# Patient Record
Sex: MALE | Race: OTHER | HISPANIC OR LATINO | URBAN - METROPOLITAN AREA
[De-identification: names, ages, dates, MRNs, and addresses within clinical notes are randomized per-mention and may not be internally consistent; named-entity substitution may affect disease eponyms.]

---

## 2017-03-13 PROBLEM — Z00.00 ENCOUNTER FOR PREVENTIVE HEALTH EXAMINATION: Status: ACTIVE | Noted: 2017-03-13

## 2020-07-04 ENCOUNTER — INPATIENT (INPATIENT)
Facility: HOSPITAL | Age: 40
LOS: 4 days | Discharge: HOME | DRG: 387 | End: 2020-07-09
Admitting: HOSPITALIST
Payer: MEDICAID

## 2020-07-04 VITALS
HEIGHT: 67 IN | WEIGHT: 250 LBS | RESPIRATION RATE: 18 BRPM | OXYGEN SATURATION: 97 % | DIASTOLIC BLOOD PRESSURE: 82 MMHG | SYSTOLIC BLOOD PRESSURE: 129 MMHG | HEART RATE: 110 BPM | TEMPERATURE: 101 F

## 2020-07-04 PROCEDURE — 99285 EMERGENCY DEPT VISIT HI MDM: CPT

## 2020-07-04 PROCEDURE — 71045 X-RAY EXAM CHEST 1 VIEW: CPT | Mod: 26

## 2020-07-04 RX ORDER — SODIUM CHLORIDE 9 MG/ML
2000 INJECTION INTRAMUSCULAR; INTRAVENOUS; SUBCUTANEOUS ONCE
Refills: 0 | Status: COMPLETED | OUTPATIENT
Start: 2020-07-04 | End: 2020-07-04

## 2020-07-04 RX ORDER — PIPERACILLIN AND TAZOBACTAM 4; .5 G/20ML; G/20ML
3.38 INJECTION, POWDER, LYOPHILIZED, FOR SOLUTION INTRAVENOUS ONCE
Refills: 0 | Status: COMPLETED | OUTPATIENT
Start: 2020-07-04 | End: 2020-07-04

## 2020-07-04 RX ORDER — ACETAMINOPHEN 500 MG
650 TABLET ORAL ONCE
Refills: 0 | Status: COMPLETED | OUTPATIENT
Start: 2020-07-04 | End: 2020-07-04

## 2020-07-04 RX ORDER — MORPHINE SULFATE 50 MG/1
4 CAPSULE, EXTENDED RELEASE ORAL ONCE
Refills: 0 | Status: DISCONTINUED | OUTPATIENT
Start: 2020-07-04 | End: 2020-07-04

## 2020-07-05 DIAGNOSIS — Z98.890 OTHER SPECIFIED POSTPROCEDURAL STATES: Chronic | ICD-10-CM

## 2020-07-05 DIAGNOSIS — K52.9 NONINFECTIVE GASTROENTERITIS AND COLITIS, UNSPECIFIED: ICD-10-CM

## 2020-07-05 DIAGNOSIS — E11.65 TYPE 2 DIABETES MELLITUS WITH HYPERGLYCEMIA: ICD-10-CM

## 2020-07-05 DIAGNOSIS — D73.5 INFARCTION OF SPLEEN: ICD-10-CM

## 2020-07-05 LAB
ACETONE SERPL-MCNC: NEGATIVE — SIGNIFICANT CHANGE UP
ALBUMIN SERPL ELPH-MCNC: 3.3 G/DL — SIGNIFICANT CHANGE UP (ref 3.3–5.2)
ALBUMIN SERPL ELPH-MCNC: 3.7 G/DL — SIGNIFICANT CHANGE UP (ref 3.3–5.2)
ALP SERPL-CCNC: 194 U/L — HIGH (ref 40–120)
ALP SERPL-CCNC: 226 U/L — HIGH (ref 40–120)
ALT FLD-CCNC: 110 U/L — HIGH
ALT FLD-CCNC: 86 U/L — HIGH
ANION GAP SERPL CALC-SCNC: 14 MMOL/L — SIGNIFICANT CHANGE UP (ref 5–17)
APPEARANCE UR: CLEAR — SIGNIFICANT CHANGE UP
APTT BLD: 35.6 SEC — HIGH (ref 27.5–35.5)
AST SERPL-CCNC: 41 U/L — HIGH
AST SERPL-CCNC: 59 U/L — HIGH
BASOPHILS # BLD AUTO: 0.04 K/UL — SIGNIFICANT CHANGE UP (ref 0–0.2)
BASOPHILS NFR BLD AUTO: 0.5 % — SIGNIFICANT CHANGE UP (ref 0–2)
BILIRUB DIRECT SERPL-MCNC: 0.2 MG/DL — SIGNIFICANT CHANGE UP (ref 0–0.3)
BILIRUB INDIRECT FLD-MCNC: 0.5 MG/DL — SIGNIFICANT CHANGE UP (ref 0.2–1)
BILIRUB SERPL-MCNC: 0.7 MG/DL — SIGNIFICANT CHANGE UP (ref 0.4–2)
BILIRUB SERPL-MCNC: 0.8 MG/DL — SIGNIFICANT CHANGE UP (ref 0.4–2)
BILIRUB UR-MCNC: NEGATIVE — SIGNIFICANT CHANGE UP
BLD GP AB SCN SERPL QL: SIGNIFICANT CHANGE UP
BUN SERPL-MCNC: 7 MG/DL — LOW (ref 8–20)
CALCIUM SERPL-MCNC: 8.9 MG/DL — SIGNIFICANT CHANGE UP (ref 8.6–10.2)
CHLORIDE SERPL-SCNC: 92 MMOL/L — LOW (ref 98–107)
CO2 SERPL-SCNC: 26 MMOL/L — SIGNIFICANT CHANGE UP (ref 22–29)
COLOR SPEC: YELLOW — SIGNIFICANT CHANGE UP
CREAT SERPL-MCNC: 0.76 MG/DL — SIGNIFICANT CHANGE UP (ref 0.5–1.3)
CRP SERPL-MCNC: 10.8 MG/DL — HIGH (ref 0–0.4)
DIFF PNL FLD: NEGATIVE — SIGNIFICANT CHANGE UP
EOSINOPHIL # BLD AUTO: 0.08 K/UL — SIGNIFICANT CHANGE UP (ref 0–0.5)
EOSINOPHIL NFR BLD AUTO: 1 % — SIGNIFICANT CHANGE UP (ref 0–6)
ERYTHROCYTE [SEDIMENTATION RATE] IN BLOOD: 38 MM/HR — HIGH (ref 0–20)
FERRITIN SERPL-MCNC: 931 NG/ML — HIGH (ref 30–400)
GAS PNL BLDV: SIGNIFICANT CHANGE UP
GLUCOSE BLDC GLUCOMTR-MCNC: 201 MG/DL — HIGH (ref 70–99)
GLUCOSE BLDC GLUCOMTR-MCNC: 205 MG/DL — HIGH (ref 70–99)
GLUCOSE BLDC GLUCOMTR-MCNC: 251 MG/DL — HIGH (ref 70–99)
GLUCOSE BLDC GLUCOMTR-MCNC: 285 MG/DL — HIGH (ref 70–99)
GLUCOSE SERPL-MCNC: 338 MG/DL — HIGH (ref 70–99)
GLUCOSE UR QL: 250
HCO3 BLDV-SCNC: 26 MMOL/L — SIGNIFICANT CHANGE UP (ref 21–29)
HCT VFR BLD CALC: 44.3 % — SIGNIFICANT CHANGE UP (ref 39–50)
HGB BLD-MCNC: 15 G/DL — SIGNIFICANT CHANGE UP (ref 13–17)
IMM GRANULOCYTES NFR BLD AUTO: 0.8 % — SIGNIFICANT CHANGE UP (ref 0–1.5)
INR BLD: 1.17 RATIO — HIGH (ref 0.88–1.16)
KETONES UR-MCNC: NEGATIVE — SIGNIFICANT CHANGE UP
LACTATE BLDV-MCNC: 1.1 MMOL/L — SIGNIFICANT CHANGE UP (ref 0.5–2)
LDH SERPL L TO P-CCNC: 317 U/L — HIGH (ref 98–192)
LEUKOCYTE ESTERASE UR-ACNC: ABNORMAL
LIDOCAIN IGE QN: 35 U/L — SIGNIFICANT CHANGE UP (ref 22–51)
LYMPHOCYTES # BLD AUTO: 1.91 K/UL — SIGNIFICANT CHANGE UP (ref 1–3.3)
LYMPHOCYTES # BLD AUTO: 23 % — SIGNIFICANT CHANGE UP (ref 13–44)
MCHC RBC-ENTMCNC: 30.1 PG — SIGNIFICANT CHANGE UP (ref 27–34)
MCHC RBC-ENTMCNC: 33.9 GM/DL — SIGNIFICANT CHANGE UP (ref 32–36)
MCV RBC AUTO: 89 FL — SIGNIFICANT CHANGE UP (ref 80–100)
MONOCYTES # BLD AUTO: 0.37 K/UL — SIGNIFICANT CHANGE UP (ref 0–0.9)
MONOCYTES NFR BLD AUTO: 4.5 % — SIGNIFICANT CHANGE UP (ref 2–14)
NEUTROPHILS # BLD AUTO: 5.84 K/UL — SIGNIFICANT CHANGE UP (ref 1.8–7.4)
NEUTROPHILS NFR BLD AUTO: 70.2 % — SIGNIFICANT CHANGE UP (ref 43–77)
NITRITE UR-MCNC: POSITIVE
PCO2 BLDV: 46 MMHG — SIGNIFICANT CHANGE UP (ref 35–50)
PH BLDV: 7.4 — SIGNIFICANT CHANGE UP (ref 7.32–7.43)
PH UR: 7 — SIGNIFICANT CHANGE UP (ref 5–8)
PLATELET # BLD AUTO: 162 K/UL — SIGNIFICANT CHANGE UP (ref 150–400)
PO2 BLDV: 48 MMHG — HIGH (ref 25–45)
POTASSIUM SERPL-MCNC: 4 MMOL/L — SIGNIFICANT CHANGE UP (ref 3.5–5.3)
POTASSIUM SERPL-SCNC: 4 MMOL/L — SIGNIFICANT CHANGE UP (ref 3.5–5.3)
PROCALCITONIN SERPL-MCNC: 0.77 NG/ML — HIGH (ref 0.02–0.1)
PROT SERPL-MCNC: 7 G/DL — SIGNIFICANT CHANGE UP (ref 6.6–8.7)
PROT SERPL-MCNC: 7.6 G/DL — SIGNIFICANT CHANGE UP (ref 6.6–8.7)
PROT UR-MCNC: 30 MG/DL
PROTHROM AB SERPL-ACNC: 13.5 SEC — SIGNIFICANT CHANGE UP (ref 10.6–13.6)
RBC # BLD: 4.98 M/UL — SIGNIFICANT CHANGE UP (ref 4.2–5.8)
RBC # FLD: 12.5 % — SIGNIFICANT CHANGE UP (ref 10.3–14.5)
RBC CASTS # UR COMP ASSIST: SIGNIFICANT CHANGE UP /HPF (ref 0–4)
SAO2 % BLDV: 82 % — SIGNIFICANT CHANGE UP
SARS-COV-2 IGG SERPL QL IA: NEGATIVE — SIGNIFICANT CHANGE UP
SARS-COV-2 IGM SERPL IA-ACNC: 0.11 INDEX — SIGNIFICANT CHANGE UP
SARS-COV-2 RNA SPEC QL NAA+PROBE: SIGNIFICANT CHANGE UP
SODIUM SERPL-SCNC: 132 MMOL/L — LOW (ref 135–145)
SP GR SPEC: 1 — LOW (ref 1.01–1.02)
UROBILINOGEN FLD QL: NEGATIVE — SIGNIFICANT CHANGE UP
WBC # BLD: 8.31 K/UL — SIGNIFICANT CHANGE UP (ref 3.8–10.5)
WBC # FLD AUTO: 8.31 K/UL — SIGNIFICANT CHANGE UP (ref 3.8–10.5)
WBC UR QL: SIGNIFICANT CHANGE UP

## 2020-07-05 PROCEDURE — 99223 1ST HOSP IP/OBS HIGH 75: CPT | Mod: GC

## 2020-07-05 PROCEDURE — 93010 ELECTROCARDIOGRAM REPORT: CPT

## 2020-07-05 PROCEDURE — 74177 CT ABD & PELVIS W/CONTRAST: CPT | Mod: 26

## 2020-07-05 PROCEDURE — 12345: CPT | Mod: NC

## 2020-07-05 RX ORDER — ACETAMINOPHEN 500 MG
1000 TABLET ORAL ONCE
Refills: 0 | Status: COMPLETED | OUTPATIENT
Start: 2020-07-05 | End: 2020-07-05

## 2020-07-05 RX ORDER — DEXTROSE 50 % IN WATER 50 %
15 SYRINGE (ML) INTRAVENOUS ONCE
Refills: 0 | Status: DISCONTINUED | OUTPATIENT
Start: 2020-07-05 | End: 2020-07-09

## 2020-07-05 RX ORDER — ACETAMINOPHEN 500 MG
325 TABLET ORAL ONCE
Refills: 0 | Status: COMPLETED | OUTPATIENT
Start: 2020-07-05 | End: 2020-07-05

## 2020-07-05 RX ORDER — GLUCAGON INJECTION, SOLUTION 0.5 MG/.1ML
1 INJECTION, SOLUTION SUBCUTANEOUS ONCE
Refills: 0 | Status: DISCONTINUED | OUTPATIENT
Start: 2020-07-05 | End: 2020-07-09

## 2020-07-05 RX ORDER — DEXTROSE 50 % IN WATER 50 %
12.5 SYRINGE (ML) INTRAVENOUS ONCE
Refills: 0 | Status: DISCONTINUED | OUTPATIENT
Start: 2020-07-05 | End: 2020-07-09

## 2020-07-05 RX ORDER — ONDANSETRON 8 MG/1
4 TABLET, FILM COATED ORAL EVERY 6 HOURS
Refills: 0 | Status: DISCONTINUED | OUTPATIENT
Start: 2020-07-05 | End: 2020-07-09

## 2020-07-05 RX ORDER — SODIUM CHLORIDE 9 MG/ML
1000 INJECTION INTRAMUSCULAR; INTRAVENOUS; SUBCUTANEOUS
Refills: 0 | Status: DISCONTINUED | OUTPATIENT
Start: 2020-07-05 | End: 2020-07-09

## 2020-07-05 RX ORDER — DEXTROSE 50 % IN WATER 50 %
25 SYRINGE (ML) INTRAVENOUS ONCE
Refills: 0 | Status: DISCONTINUED | OUTPATIENT
Start: 2020-07-05 | End: 2020-07-09

## 2020-07-05 RX ORDER — INSULIN LISPRO 100/ML
VIAL (ML) SUBCUTANEOUS AT BEDTIME
Refills: 0 | Status: DISCONTINUED | OUTPATIENT
Start: 2020-07-05 | End: 2020-07-09

## 2020-07-05 RX ORDER — INSULIN LISPRO 100/ML
VIAL (ML) SUBCUTANEOUS
Refills: 0 | Status: DISCONTINUED | OUTPATIENT
Start: 2020-07-05 | End: 2020-07-09

## 2020-07-05 RX ORDER — SODIUM CHLORIDE 9 MG/ML
1000 INJECTION, SOLUTION INTRAVENOUS
Refills: 0 | Status: DISCONTINUED | OUTPATIENT
Start: 2020-07-05 | End: 2020-07-09

## 2020-07-05 RX ORDER — INSULIN LISPRO 100/ML
3 VIAL (ML) SUBCUTANEOUS
Refills: 0 | Status: DISCONTINUED | OUTPATIENT
Start: 2020-07-05 | End: 2020-07-06

## 2020-07-05 RX ORDER — ENOXAPARIN SODIUM 100 MG/ML
40 INJECTION SUBCUTANEOUS EVERY 12 HOURS
Refills: 0 | Status: DISCONTINUED | OUTPATIENT
Start: 2020-07-05 | End: 2020-07-09

## 2020-07-05 RX ORDER — ACETAMINOPHEN 500 MG
650 TABLET ORAL EVERY 4 HOURS
Refills: 0 | Status: DISCONTINUED | OUTPATIENT
Start: 2020-07-05 | End: 2020-07-09

## 2020-07-05 RX ORDER — CEFTRIAXONE 500 MG/1
1000 INJECTION, POWDER, FOR SOLUTION INTRAMUSCULAR; INTRAVENOUS ONCE
Refills: 0 | Status: COMPLETED | OUTPATIENT
Start: 2020-07-05 | End: 2020-07-05

## 2020-07-05 RX ORDER — CEFTRIAXONE 500 MG/1
INJECTION, POWDER, FOR SOLUTION INTRAMUSCULAR; INTRAVENOUS
Refills: 0 | Status: DISCONTINUED | OUTPATIENT
Start: 2020-07-05 | End: 2020-07-09

## 2020-07-05 RX ORDER — CEFTRIAXONE 500 MG/1
1000 INJECTION, POWDER, FOR SOLUTION INTRAMUSCULAR; INTRAVENOUS EVERY 24 HOURS
Refills: 0 | Status: DISCONTINUED | OUTPATIENT
Start: 2020-07-06 | End: 2020-07-09

## 2020-07-05 RX ORDER — INSULIN GLARGINE 100 [IU]/ML
15 INJECTION, SOLUTION SUBCUTANEOUS AT BEDTIME
Refills: 0 | Status: DISCONTINUED | OUTPATIENT
Start: 2020-07-05 | End: 2020-07-06

## 2020-07-05 RX ADMIN — Medication 1 APPLICATION(S): at 17:23

## 2020-07-05 RX ADMIN — Medication 650 MILLIGRAM(S): at 21:35

## 2020-07-05 RX ADMIN — Medication 1 APPLICATION(S): at 05:16

## 2020-07-05 RX ADMIN — ENOXAPARIN SODIUM 40 MILLIGRAM(S): 100 INJECTION SUBCUTANEOUS at 05:16

## 2020-07-05 RX ADMIN — Medication 325 MILLIGRAM(S): at 22:26

## 2020-07-05 RX ADMIN — MORPHINE SULFATE 4 MILLIGRAM(S): 50 CAPSULE, EXTENDED RELEASE ORAL at 00:29

## 2020-07-05 RX ADMIN — Medication 3 UNIT(S): at 11:26

## 2020-07-05 RX ADMIN — ENOXAPARIN SODIUM 40 MILLIGRAM(S): 100 INJECTION SUBCUTANEOUS at 17:24

## 2020-07-05 RX ADMIN — Medication 400 MILLIGRAM(S): at 06:35

## 2020-07-05 RX ADMIN — SODIUM CHLORIDE 100 MILLILITER(S): 9 INJECTION INTRAMUSCULAR; INTRAVENOUS; SUBCUTANEOUS at 06:35

## 2020-07-05 RX ADMIN — PIPERACILLIN AND TAZOBACTAM 200 GRAM(S): 4; .5 INJECTION, POWDER, LYOPHILIZED, FOR SOLUTION INTRAVENOUS at 00:29

## 2020-07-05 RX ADMIN — Medication 650 MILLIGRAM(S): at 00:29

## 2020-07-05 RX ADMIN — Medication 2: at 11:26

## 2020-07-05 RX ADMIN — Medication 3 UNIT(S): at 17:25

## 2020-07-05 RX ADMIN — Medication 3 UNIT(S): at 08:14

## 2020-07-05 RX ADMIN — Medication 3: at 17:25

## 2020-07-05 RX ADMIN — CEFTRIAXONE 100 MILLIGRAM(S): 500 INJECTION, POWDER, FOR SOLUTION INTRAMUSCULAR; INTRAVENOUS at 05:14

## 2020-07-05 RX ADMIN — Medication 650 MILLIGRAM(S): at 16:05

## 2020-07-05 RX ADMIN — SODIUM CHLORIDE 2000 MILLILITER(S): 9 INJECTION INTRAMUSCULAR; INTRAVENOUS; SUBCUTANEOUS at 00:29

## 2020-07-05 RX ADMIN — Medication 3: at 08:14

## 2020-07-05 RX ADMIN — INSULIN GLARGINE 15 UNIT(S): 100 INJECTION, SOLUTION SUBCUTANEOUS at 21:33

## 2020-07-05 NOTE — H&P ADULT - ASSESSMENT
40y M with PMHx significant for Uncontrolled DM II, presents today c/o abdominal pain. Being admitted for Splenic infarct of unclear etiology.    > Admit to medicine  > Bed: Any    > Diet: Diabetic   > Activity: ambulate as tolerated  > Nursing: vitals per routine    Abdominal pain 2/2 Splenic infarct of unclear etiology.  Differentials include hypercoagulable states: including COVID 19  Denies any clot disorders running on his family.   CT abdomen: Multiple vague wedge-shaped opacities in enlarged spleen, likely represent splenic infarcts.   S/P Zosyn in the ED  Will empirically cover with Rocephin, will adjust Abx as needed  Blood cx pending   COVID PCR pending    Uncontrolled DM II  Pt poor complaint, not on any anti-diabetic meds at this time   A1c pending   Will start Lantus 15 U at bed time  Hypoglycemic protocol in place  Low ISS    Balanitis 2/2 poor glucose controlled  Not on any meds for DM  Will start Clotrimazole topical     Hyponatremia   Likely due to poor PO intake  Will give NS 100cc/h for 12 hours  Will trend     Transaminitis   Likely TEJEDA  Lifestyle changes advised     DVT Prophylaxis   Lovenox SC    Code status  Full code 40y M with PMHx significant for Uncontrolled DM II, presents today c/o abdominal pain. Being admitted for Splenic infarct of unclear etiology, complicated by SIRS    > Admit to medicine  > Bed: Any    > Diet: Diabetic   > Activity: ambulate as tolerated  > Nursing: vitals per routine    Abdominal pain 2/2 Splenic infarct of unclear etiology, complicated by SIRS  Differentials include hypercoagulable states: including COVID 19  Denies any clot disorders running on his family.   CT abdomen: Multiple vague wedge-shaped opacities in enlarged spleen, likely represent splenic infarcts.   S/P Zosyn in the ED  Will empirically cover with Rocephin, will adjust Abx as needed  Will give NS 100cc/h for 12 hours  Blood cx pending   COVID PCR pending    Uncontrolled DM II  Pt poor complaint, not on any anti-diabetic meds at this time   A1c pending   Will start Lantus 15 U at bed time  Hypoglycemic protocol in place  Low ISS    Balanitis 2/2 poor glucose controlled  Not on any meds for DM  Will start Clotrimazole topical     Pseudohyponatremia   Corrected Na: 136  Will trend     Transaminitis   Likely TEJEDA  Lifestyle changes advised     DVT Prophylaxis   Lovenox SC    Code status  Full code 40y M with PMHx significant for Uncontrolled DM II, presents today c/o abdominal pain. Being admitted for Splenic infarct of unclear etiology, complicated by SIRS. Balanitis     > Admit to medicine  > Bed: Any    > Diet: Diabetic   > Activity: ambulate as tolerated  > Nursing: vitals per routine    Abdominal pain 2/2 Splenic infarct of unclear etiology, complicated by SIRS  Differentials include hyper-coagulable states/septic emboli?/Malignancy: including COVID 19  Denies any clot disorders running in family  CT abdomen: Multiple vague wedge-shaped opacities in enlarged spleen, likely represent splenic infarcts.   Consider Hematology eval if non-infectious etiology  MARIO if blood cx pos.  S/P Zosyn in the ED  Add probiotics while on abx  Will empirically cover with Rocephin, will adjust Abx as needed  Will give NS 100cc/h for 12 hours  Blood cx pending   trend temp curve, WBC  MAP/SBP above goal, cap refill normal, doubt balanitis cause of SIRS  COVID PCR pending    Uncontrolled DM II  Pt poor complaint, not on any anti-diabetic meds at this time   A1c pending   Will start Lantus 15 U at bed time  Hypoglycemic protocol in place  Low ISS  DM teaching     Balanitis 2/2 poor glucose controlled  Not on any meds for DM  Will start Clotrimazole topical     Pseudohyponatremia   Corrected Na: 136  Will trend     Transaminitis   Likely TEJEDA  Lifestyle changes advised   check Hep profile    DVT Prophylaxis   Lovenox SC/ambulation/OOB    Code status  Full code

## 2020-07-05 NOTE — ED PROVIDER NOTE - CLINICAL SUMMARY MEDICAL DECISION MAKING FREE TEXT BOX
40y M presenting for 1 day of fever and LLQ pain.  Pt tachycardic and febrile on arrival.  Will initiate sepsis workup.  Labs, EKG, CXR, CT abd/pelvis, UA, cultures.  Will treat with Zosyn, tylenol, morphine, IV fluid bolus (based on IBW), clotrimazole for balanitis.

## 2020-07-05 NOTE — PROGRESS NOTE ADULT - SUBJECTIVE AND OBJECTIVE BOX
INTERVAL HPI/OVERNIGHT EVENTS:    CC:    Vital Signs Last 24 Hrs  T(C): 37.3 (2020 08:02), Max: 38.1 (2020 23:04)  T(F): 99.2 (2020 08:02), Max: 100.6 (2020 23:04)  HR: 91 (2020 08:02) (91 - 110)  BP: 104/66 (2020 08:02) (104/66 - 133/80)  BP(mean): --  RR: 20 (2020 08:02) (18 - 20)  SpO2: 94% (2020 08:02) (94% - 97%)    PHYSICAL EXAM:    GENERAL:   CHEST/LUNG:   HEART:   ABDOMEN:   EXTREMITIES:      MEDICATIONS  (STANDING):  cefTRIAXone   IVPB      clotrimazole 1% Cream 1 Application(s) Topical two times a day  dextrose 5%. 1000 milliLiter(s) (50 mL/Hr) IV Continuous <Continuous>  dextrose 50% Injectable 12.5 Gram(s) IV Push once  dextrose 50% Injectable 25 Gram(s) IV Push once  dextrose 50% Injectable 25 Gram(s) IV Push once  enoxaparin Injectable 40 milliGRAM(s) SubCutaneous every 12 hours  insulin glargine Injectable (LANTUS) 15 Unit(s) SubCutaneous at bedtime  insulin lispro (HumaLOG) corrective regimen sliding scale   SubCutaneous three times a day before meals  insulin lispro (HumaLOG) corrective regimen sliding scale   SubCutaneous at bedtime  insulin lispro Injectable (HumaLOG) 3 Unit(s) SubCutaneous three times a day before meals  sodium chloride 0.9%. 1000 milliLiter(s) (100 mL/Hr) IV Continuous <Continuous>    MEDICATIONS  (PRN):  acetaminophen   Tablet .. 650 milliGRAM(s) Oral every 4 hours PRN Temp greater or equal to 38C (100.4F), Mild Pain (1 - 3)  dextrose 40% Gel 15 Gram(s) Oral once PRN Blood Glucose LESS THAN 70 milliGRAM(s)/deciliter  glucagon  Injectable 1 milliGRAM(s) IntraMuscular once PRN Glucose LESS THAN 70 milligrams/deciliter  ondansetron Injectable 4 milliGRAM(s) IV Push every 6 hours PRN Nausea      Allergies    No Known Allergies    Intolerances          LABS:                          15.0   8.31  )-----------( 162      ( 2020 00:27 )             44.3     07    132<L>  |  92<L>  |  7.0<L>  ----------------------------<  338<H>  4.0   |  26.0  |  0.76    Ca    8.9      2020 00:27    TPro  7.0  /  Alb  3.3  /  TBili  0.7  /  DBili  0.2  /  AST  41<H>  /  ALT  86<H>  /  AlkPhos  194<H>      PT/INR - ( 2020 00:27 )   PT: 13.5 sec;   INR: 1.17 ratio         PTT - ( 2020 00:27 )  PTT:35.6 sec  Urinalysis Basic - ( 2020 03:53 )    Color: Yellow / Appearance: Clear / S.005 / pH: x  Gluc: x / Ketone: Negative  / Bili: Negative / Urobili: Negative   Blood: x / Protein: 30 mg/dL / Nitrite: Positive   Leuk Esterase: Trace / RBC: 0-2 /HPF / WBC 3-5   Sq Epi: x / Non Sq Epi: x / Bacteria: x        RADIOLOGY & ADDITIONAL TESTS: INTERVAL HPI/OVERNIGHT EVENTS:    CC: SIRS r/o sepsis, uncontrolled diabetes mellitus, splenic infarct    Chart and course reviewed. Mild left upper quadrant pain. No nausea, vomiting, no sick contacts. Unsure about what medications he takes at home for diabetes.     Vital Signs Last 24 Hrs  T(C): 37.3 (2020 08:02), Max: 38.1 (2020 23:04)  T(F): 99.2 (2020 08:02), Max: 100.6 (2020 23:04)  HR: 91 (2020 08:02) (91 - 110)  BP: 104/66 (2020 08:02) (104/66 - 133/80)  BP(mean): --  RR: 20 (2020 08:02) (18 - 20)  SpO2: 94% (2020 08:02) (94% - 97%)    PHYSICAL EXAM:    GENERAL: alert, not in distress, well built  CHEST/LUNG: b/l air entry  HEART: regular  ABDOMEN: left upper quadrant tenderness  EXTREMITIES:  no edema, tenderness    MEDICATIONS  (STANDING):  cefTRIAXone   IVPB      clotrimazole 1% Cream 1 Application(s) Topical two times a day  dextrose 5%. 1000 milliLiter(s) (50 mL/Hr) IV Continuous <Continuous>  dextrose 50% Injectable 12.5 Gram(s) IV Push once  dextrose 50% Injectable 25 Gram(s) IV Push once  dextrose 50% Injectable 25 Gram(s) IV Push once  enoxaparin Injectable 40 milliGRAM(s) SubCutaneous every 12 hours  insulin glargine Injectable (LANTUS) 15 Unit(s) SubCutaneous at bedtime  insulin lispro (HumaLOG) corrective regimen sliding scale   SubCutaneous three times a day before meals  insulin lispro (HumaLOG) corrective regimen sliding scale   SubCutaneous at bedtime  insulin lispro Injectable (HumaLOG) 3 Unit(s) SubCutaneous three times a day before meals  sodium chloride 0.9%. 1000 milliLiter(s) (100 mL/Hr) IV Continuous <Continuous>    MEDICATIONS  (PRN):  acetaminophen   Tablet .. 650 milliGRAM(s) Oral every 4 hours PRN Temp greater or equal to 38C (100.4F), Mild Pain (1 - 3)  dextrose 40% Gel 15 Gram(s) Oral once PRN Blood Glucose LESS THAN 70 milliGRAM(s)/deciliter  glucagon  Injectable 1 milliGRAM(s) IntraMuscular once PRN Glucose LESS THAN 70 milligrams/deciliter  ondansetron Injectable 4 milliGRAM(s) IV Push every 6 hours PRN Nausea      Allergies    No Known Allergies    Intolerances          LABS:                          15.0   8.31  )-----------( 162      ( 2020 00:27 )             44.3     07-    132<L>  |  92<L>  |  7.0<L>  ----------------------------<  338<H>  4.0   |  26.0  |  0.76    Ca    8.9      2020 00:27    TPro  7.0  /  Alb  3.3  /  TBili  0.7  /  DBili  0.2  /  AST  41<H>  /  ALT  86<H>  /  AlkPhos  194<H>  07-    PT/INR - ( 2020 00:27 )   PT: 13.5 sec;   INR: 1.17 ratio         PTT - ( 2020 00:27 )  PTT:35.6 sec  Urinalysis Basic - ( 2020 03:53 )    Color: Yellow / Appearance: Clear / S.005 / pH: x  Gluc: x / Ketone: Negative  / Bili: Negative / Urobili: Negative   Blood: x / Protein: 30 mg/dL / Nitrite: Positive   Leuk Esterase: Trace / RBC: 0-2 /HPF / WBC 3-5   Sq Epi: x / Non Sq Epi: x / Bacteria: x        RADIOLOGY & ADDITIONAL TESTS:

## 2020-07-05 NOTE — ED ADULT NURSE REASSESSMENT NOTE - NS ED NURSE REASSESS COMMENT FT1
Received report from offgoing RN. Charting as noted. Patient A&Ox3 in no apparent distress. Patient complaining of abdominal pain, but has improved since arrived to ED. Vital signs stable. See flowsheet as per vital signs. Patient remains on cardiac monitor with continuous pulse ox. Respirations even, spontaneous, and unlabored. Awaiting CT results and UA sample. Plan of care explained. Verbalized understanding. Call bell within reach.

## 2020-07-05 NOTE — ED PROVIDER NOTE - OBJECTIVE STATEMENT
40y M w/ no known PMH, presenting for fever and abdominal pain x 1 day.  Pt complains of intermittent LLQ pain, worse with cough, as well as fever and body aches.  Took Advil x 2 this morning.  Denies cough, CP, SOB, n/v/d, urinary complaints, flank pain, melena, hematochezia.  Also notes rash to penis.  Denies sick contacts.  No hx of abdominal surgery.  Last bowel movement was earlier today.  Has never had a colonoscopy.

## 2020-07-05 NOTE — ED ADULT NURSE NOTE - OBJECTIVE STATEMENT
40y Male A&Ox4 c/o abdominal pain, headache, and subjective fevers. Pt reports sudden onset of symtoms yesterday, states the abd pain is consistent and reducing his PO intake. Pt denies LOC, blurry vision, chest pain, shortness of breath, changes in GI/ patterns. No obvious signs of trauma or injury.

## 2020-07-05 NOTE — ED PROVIDER NOTE - ATTENDING CONTRIBUTION TO CARE
HPI: 39yo male with no PMH presenting with fevers x 1day, a/w LLQ pain. no nausea/vomiting, no urinary symptoms. No fevers/chills. no CP/SOB.     PE:  Gen: NAD  Head: NCAT  HEENT: Oral mucosa moist, normal conjunctiva  CV: RRR no murmurs, normal perfusion  Resp: CTA b/l, no wheezing, rales, rhonchi, no respiratory distress  GI: Abd Soft +TTP LLQ, +rebound tenderness  Neuro: No focal neuro deficits  MSK: FROM all 4 extremities, no deformity  Skin: No rash, no bruising  Psych: Normal affect    MDM: Pt with fever and lower abdominal pain- check labs, ct a/p, give fluids, antibiotics and reassess. Mariana Longoria DO         I performed a history and physical exam of the patient and discussed their management with the resident. I reviewed the resident's note and agree with the documented findings and plan of care. My medical decision making and observations are found above.

## 2020-07-05 NOTE — ED PROVIDER NOTE - CCCP TRG CHIEF CMPLNT
Establish care with a primary care provider for re-evaluation.    Bentyl as needed for abdominal cramping, Protonix daily, Zofran for nausea.  Drink lots of fluids, stay well hydrated.  BRAT diet, progress as tolerated.    Follow-up with primary for re-evaluation.  Abstain from sexual intercourse until you are retested.  Please return to this ED if symptoms persist or worsen, if unable to tolerate food or liquids, if you begin with fever or shortness of breath, if any other problems occur.   multiple medical complaints

## 2020-07-05 NOTE — H&P ADULT - NSHPLABSRESULTS_GEN_ALL_CORE
15.0   8.31  )-----------( 162      ( 05 Jul 2020 00:27 )             44.3       07-05    132<L>  |  92<L>  |  7.0<L>  ----------------------------<  338<H>  4.0   |  26.0  |  0.76    Ca    8.9      05 Jul 2020 00:27    TPro  7.6  /  Alb  3.7  /  TBili  0.8  /  DBili  x   /  AST  59<H>  /  ALT  110<H>  /  AlkPhos  226<H>  07-05      < from: CT Abdomen and Pelvis w/ IV Cont (07.05.20 @ 02:05) >      IMPRESSION:    Multiple vague wedge-shaped opacities in enlarged spleen. Findings are nonspecific, likely represent splenic infarcts. Infectious/inflammatory or neoplastic process considered in the differential. Consider nonemergent correlation with MR for better characterization.    Wall thickening of distal sigmoid colonic loop extending to the rectum significant surrounding stranding. This may be on the basis of inadequate distention or represent proctocolitis in appropriate clinical setting. Consider nonemergent colonoscopy evaluation if there concern for underlying lesion. No evidence of small bowel obstruction.     Normal appendix.      < end of copied text >

## 2020-07-05 NOTE — CHART NOTE - NSCHARTNOTEFT_GEN_A_CORE
Notified by RN pt rectal temp 103.8 F, P 105. Pt is a 39 y/o male admitted for abdominal pain found to have splenic infarct and proctocolitis. Pt on Rocephin IVPB. Concern for sepsis-BC and UC drawn today and pending results. Pt is stable, in no signs of acute distress, vital signs stable /88, RR 22 O2 sat 92% on room, air. RN administered Tylenol 650mg PO for temp prior to notification, ordered an additional Tylenol 325mg tab PO x1 dose and instructed RN to apply cool compresses to B/L axilla and groin, repeat rectal temp in one hour and notify PA. RN to continue to monitor pt and escalate PRN. Notified by RN pt rectal temp 103.8 F, P 105. Pt is a 39 y/o male admitted for abdominal pain found to have splenic infarct and proctocolitis. Pt on Rocephin IVPB. Concern for sepsis-BC and UC drawn today and pending results. Pt is stable, in no signs of acute distress, vital signs stable /88, RR 22 O2 sat 92% on room, air. RN administered Tylenol 650mg PO for temp prior to notification, ordered an additional Tylenol 325mg tab PO x1 dose and instructed RN to apply cool compresses to B/L axilla and groin, repeat rectal temp in one hour and notify PA. RN to continue to monitor pt and escalate PRN. Repeat temp at 1 hour post Tylenol administration 101.4F rectal. Continue cool compresses and continue to monitor.     7/06 04:00AM: Notified by RN pt temp this  F rectal. Pt hemodynamically stable. STAT lactate 0.9. Ordered Ofirmev 1 gram IVPB x1 dose and cooling blanket to be applied. Repeat rectal temp in 1 hour. Continue to monitor.

## 2020-07-05 NOTE — H&P ADULT - NSHPSOCIALHISTORY_GEN_ALL_CORE
Drinks socially, denies smoking or illicit drugs Drinks socially CAGE 0/4, denies smoking or illicit drugs

## 2020-07-05 NOTE — H&P ADULT - NSHPSOURCEINFORD_GEN_ALL_CORE
[facility]  [address] [id number] [phone]            Hermelindo Duffy  [address] [place] [address]        August 7, 2019    Dear Hermelindo,    While refilling your prescription today, we noticed that you are due for an appointment with your provider.  We will refill your prescription for 30 days, but a follow-up appointment must be made before any additional refills can be approved.     Taking care of your health is important to us and we look forward to seeing you in the near future.  Please call us at [phone] or [phone] (or use Membersuite) to schedule an appointment.     Please disregard this notice if you have already made an appointment.    Sincerely,        [facility]  
Patient/Physician/Provider/Chart(s)

## 2020-07-05 NOTE — ED PROVIDER NOTE - PHYSICAL EXAMINATION
Constitutional: Awake, alert, in no acute distress  Eyes: no scleral icterus  HENT: normocephalic, atraumatic, moist oral mucosa  CV: +tachycardia, regular rhythm, no murmur  Pulm: non-labored respirations, CTAB  Abdomen: soft, non-distended, +LLQ tenderness with +rebound, no guarding, no CVAT  : uncircumcised male, +mild erythema with scant whitish exudate noted to glans penis; no testicular tenderness.  Exam chaperoned by Juan VASQUEZ.  Extremities: no edema, no deformity  Skin: no other rash, no jaundice  Neuro: AAOx3, moving all extremities equally

## 2020-07-05 NOTE — H&P ADULT - HISTORY OF PRESENT ILLNESS
40y M with PMHx significant for Uncontrolled DM II, presents today c/o abdominal pain. States, pain started 2 days ago, but has progressively worsened, is located in left side abdominal wall, non radiated, cramping like pain. Additionally endorses subjective fever, malaise, nausea w/o vomiting, took Advil but not improvement was noted.  Also, reports redness and itchiness at the tip of his penis. He has been diagnosed with DM II many years ago but is not on any treatment or follows up with any doctor   Denies any sick contact, recent travel, clots disorders or malignancy

## 2020-07-05 NOTE — ED PROVIDER NOTE - NS ED ROS FT
Constitutional: +fever, +body aches  Eyes: no vision changes  ENT: no nasal congestion, no sore throat  CV: no chest pain  Resp: no cough, no shortness of breath  GI: +abdominal pain, no vomiting, no diarrhea  : no dysuria  MSK: no joint pain  Skin: +rash  Neuro: no headache, no weakness, no paresthesias

## 2020-07-05 NOTE — H&P ADULT - NSHPPHYSICALEXAM_GEN_ALL_CORE
Vital Signs Last 24 Hrs  T(C): 38.1 (04 Jul 2020 23:04), Max: 38.1 (04 Jul 2020 23:04)  T(F): 100.6 (04 Jul 2020 23:04), Max: 100.6 (04 Jul 2020 23:04)  HR: 94 (05 Jul 2020 03:05) (94 - 110)  BP: 124/72 (05 Jul 2020 03:05) (124/72 - 133/80)  RR: 20 (05 Jul 2020 03:05) (18 - 20)  SpO2: 95% (05 Jul 2020 03:05) (95% - 97%)    General: Obese man, NAD  Head:  Normocephalic, atraumatic  ENT:  Mucosa moist, no ulcerations  Neck:  Supple, no sinuses or palpable masses  Respiratory: CTA B/L  CV: RRR, S1S2, no murmur  Abdominal: Soft, Tender to palpation in the LLQ and LUQ areas, no palpable mass, + BS   Extremities: No edema, + peripheral pulses, FROM all 4 extremity  Neurology: A&Ox3, CN II-XII grossly normal Vital Signs Last 24 Hrs  T(C): 38.1 (04 Jul 2020 23:04), Max: 38.1 (04 Jul 2020 23:04)  T(F): 100.6 (04 Jul 2020 23:04), Max: 100.6 (04 Jul 2020 23:04)  HR: 94 (05 Jul 2020 03:05) (94 - 110)  BP: 124/72 (05 Jul 2020 03:05) (124/72 - 133/80)  RR: 20 (05 Jul 2020 03:05) (18 - 20)  SpO2: 95% (05 Jul 2020 03:05) (95% - 97%)    General: Obese man, NAD  Head:  Normocephalic, atraumatic  ENT:  Mucosa moist, no ulcerations  Neck:  Supple, no sinuses or palpable masses  Respiratory: CTA B/L  CV: RRR, S1S2, no murmur  Abdominal: Soft, Tender to palpation in the LLQ and LUQ areas, no palpable mass, + BS   : Circinate balanitis   Extremities: No edema, + peripheral pulses, FROM all 4 extremity  Neurology: A&Ox3, CN II-XII grossly normal

## 2020-07-06 LAB
A1C WITH ESTIMATED AVERAGE GLUCOSE RESULT: 13.3 % — HIGH (ref 4–5.6)
ALBUMIN SERPL ELPH-MCNC: 3.5 G/DL — SIGNIFICANT CHANGE UP (ref 3.3–5.2)
ALP SERPL-CCNC: 224 U/L — HIGH (ref 40–120)
ALT FLD-CCNC: 98 U/L — HIGH
ANION GAP SERPL CALC-SCNC: 14 MMOL/L — SIGNIFICANT CHANGE UP (ref 5–17)
AST SERPL-CCNC: 58 U/L — HIGH
BASOPHILS # BLD AUTO: 0.03 K/UL — SIGNIFICANT CHANGE UP (ref 0–0.2)
BASOPHILS NFR BLD AUTO: 0.3 % — SIGNIFICANT CHANGE UP (ref 0–2)
BILIRUB DIRECT SERPL-MCNC: 0.2 MG/DL — SIGNIFICANT CHANGE UP (ref 0–0.3)
BILIRUB INDIRECT FLD-MCNC: 0.6 MG/DL — SIGNIFICANT CHANGE UP (ref 0.2–1)
BILIRUB SERPL-MCNC: 0.8 MG/DL — SIGNIFICANT CHANGE UP (ref 0.4–2)
BUN SERPL-MCNC: 6 MG/DL — LOW (ref 8–20)
CALCIUM SERPL-MCNC: 8.7 MG/DL — SIGNIFICANT CHANGE UP (ref 8.6–10.2)
CHLORIDE SERPL-SCNC: 97 MMOL/L — LOW (ref 98–107)
CO2 SERPL-SCNC: 23 MMOL/L — SIGNIFICANT CHANGE UP (ref 22–29)
CREAT SERPL-MCNC: 0.71 MG/DL — SIGNIFICANT CHANGE UP (ref 0.5–1.3)
CULTURE RESULTS: NO GROWTH — SIGNIFICANT CHANGE UP
D DIMER BLD IA.RAPID-MCNC: 6490 NG/ML DDU — HIGH
EOSINOPHIL # BLD AUTO: 0.02 K/UL — SIGNIFICANT CHANGE UP (ref 0–0.5)
EOSINOPHIL NFR BLD AUTO: 0.2 % — SIGNIFICANT CHANGE UP (ref 0–6)
ESTIMATED AVERAGE GLUCOSE: 335 MG/DL — HIGH (ref 68–114)
FERRITIN SERPL-MCNC: 1133 NG/ML — HIGH (ref 30–400)
GLUCOSE BLDC GLUCOMTR-MCNC: 216 MG/DL — HIGH (ref 70–99)
GLUCOSE BLDC GLUCOMTR-MCNC: 238 MG/DL — HIGH (ref 70–99)
GLUCOSE BLDC GLUCOMTR-MCNC: 246 MG/DL — HIGH (ref 70–99)
GLUCOSE BLDC GLUCOMTR-MCNC: 302 MG/DL — HIGH (ref 70–99)
GLUCOSE SERPL-MCNC: 246 MG/DL — HIGH (ref 70–99)
HCT VFR BLD CALC: 40.7 % — SIGNIFICANT CHANGE UP (ref 39–50)
HGB BLD-MCNC: 13.7 G/DL — SIGNIFICANT CHANGE UP (ref 13–17)
IMM GRANULOCYTES NFR BLD AUTO: 0.9 % — SIGNIFICANT CHANGE UP (ref 0–1.5)
LACTATE BLDV-MCNC: 0.9 MMOL/L — SIGNIFICANT CHANGE UP (ref 0.5–2)
LDH SERPL L TO P-CCNC: 412 U/L — HIGH (ref 98–192)
LYMPHOCYTES # BLD AUTO: 2.54 K/UL — SIGNIFICANT CHANGE UP (ref 1–3.3)
LYMPHOCYTES # BLD AUTO: 26.4 % — SIGNIFICANT CHANGE UP (ref 13–44)
MCHC RBC-ENTMCNC: 29.9 PG — SIGNIFICANT CHANGE UP (ref 27–34)
MCHC RBC-ENTMCNC: 33.7 GM/DL — SIGNIFICANT CHANGE UP (ref 32–36)
MCV RBC AUTO: 88.9 FL — SIGNIFICANT CHANGE UP (ref 80–100)
MONOCYTES # BLD AUTO: 0.42 K/UL — SIGNIFICANT CHANGE UP (ref 0–0.9)
MONOCYTES NFR BLD AUTO: 4.4 % — SIGNIFICANT CHANGE UP (ref 2–14)
NEUTROPHILS # BLD AUTO: 6.53 K/UL — SIGNIFICANT CHANGE UP (ref 1.8–7.4)
NEUTROPHILS NFR BLD AUTO: 67.8 % — SIGNIFICANT CHANGE UP (ref 43–77)
PLATELET # BLD AUTO: 228 K/UL — SIGNIFICANT CHANGE UP (ref 150–400)
POTASSIUM SERPL-MCNC: 3.8 MMOL/L — SIGNIFICANT CHANGE UP (ref 3.5–5.3)
POTASSIUM SERPL-SCNC: 3.8 MMOL/L — SIGNIFICANT CHANGE UP (ref 3.5–5.3)
PROT SERPL-MCNC: 7.3 G/DL — SIGNIFICANT CHANGE UP (ref 6.6–8.7)
RBC # BLD: 4.58 M/UL — SIGNIFICANT CHANGE UP (ref 4.2–5.8)
RBC # FLD: 12.7 % — SIGNIFICANT CHANGE UP (ref 10.3–14.5)
SODIUM SERPL-SCNC: 134 MMOL/L — LOW (ref 135–145)
SPECIMEN SOURCE: SIGNIFICANT CHANGE UP
WBC # BLD: 9.63 K/UL — SIGNIFICANT CHANGE UP (ref 3.8–10.5)
WBC # FLD AUTO: 9.63 K/UL — SIGNIFICANT CHANGE UP (ref 3.8–10.5)

## 2020-07-06 PROCEDURE — 99223 1ST HOSP IP/OBS HIGH 75: CPT

## 2020-07-06 PROCEDURE — 99233 SBSQ HOSP IP/OBS HIGH 50: CPT

## 2020-07-06 PROCEDURE — 93970 EXTREMITY STUDY: CPT | Mod: 26

## 2020-07-06 RX ORDER — INSULIN GLARGINE 100 [IU]/ML
20 INJECTION, SOLUTION SUBCUTANEOUS AT BEDTIME
Refills: 0 | Status: DISCONTINUED | OUTPATIENT
Start: 2020-07-06 | End: 2020-07-09

## 2020-07-06 RX ORDER — ACETAMINOPHEN 500 MG
1000 TABLET ORAL ONCE
Refills: 0 | Status: COMPLETED | OUTPATIENT
Start: 2020-07-06 | End: 2020-07-06

## 2020-07-06 RX ORDER — INSULIN LISPRO 100/ML
6 VIAL (ML) SUBCUTANEOUS
Refills: 0 | Status: DISCONTINUED | OUTPATIENT
Start: 2020-07-06 | End: 2020-07-09

## 2020-07-06 RX ADMIN — Medication 6 UNIT(S): at 16:53

## 2020-07-06 RX ADMIN — Medication 4: at 12:04

## 2020-07-06 RX ADMIN — ENOXAPARIN SODIUM 40 MILLIGRAM(S): 100 INJECTION SUBCUTANEOUS at 04:44

## 2020-07-06 RX ADMIN — CEFTRIAXONE 100 MILLIGRAM(S): 500 INJECTION, POWDER, FOR SOLUTION INTRAMUSCULAR; INTRAVENOUS at 06:04

## 2020-07-06 RX ADMIN — ENOXAPARIN SODIUM 40 MILLIGRAM(S): 100 INJECTION SUBCUTANEOUS at 17:11

## 2020-07-06 RX ADMIN — Medication 650 MILLIGRAM(S): at 07:51

## 2020-07-06 RX ADMIN — Medication 650 MILLIGRAM(S): at 16:53

## 2020-07-06 RX ADMIN — Medication 3 UNIT(S): at 07:50

## 2020-07-06 RX ADMIN — INSULIN GLARGINE 20 UNIT(S): 100 INJECTION, SOLUTION SUBCUTANEOUS at 21:16

## 2020-07-06 RX ADMIN — Medication 2: at 07:50

## 2020-07-06 RX ADMIN — Medication 3 UNIT(S): at 12:04

## 2020-07-06 RX ADMIN — Medication 2: at 16:53

## 2020-07-06 RX ADMIN — Medication 400 MILLIGRAM(S): at 04:43

## 2020-07-06 RX ADMIN — Medication 1 APPLICATION(S): at 04:44

## 2020-07-06 RX ADMIN — Medication 1 APPLICATION(S): at 17:11

## 2020-07-06 NOTE — CONSULT NOTE ADULT - SUBJECTIVE AND OBJECTIVE BOX
[facility]  INFECTIOUS DISEASES AND INTERNAL MEDICINE at [place]  =======================================================  Jose Luis Pedro MD  Diplomates American Board of Internal Medicine and Infectious Diseases  Tel: [phone]      Fax: [phone]  =======================================================      N-[id number]  ROHINI SINGH    CC: Patient is a 40y old  Male who presents with a chief complaint of Abdominal pain (05 Jul 2020 08:59)      40y  Male with h/o Uncontrolled DM II. Patient presented to the ER 7/5 with c/o abdominal pain associated with fevers which started 7/3 night. Symptoms progressively worsened. The Abdominal pain is located in left side abdominal wall, non radiating, cramping like pain. Patient took Advil with no improvement in symptoms. He has been diagnosed with DM II many years ago but is not on any treatment or follows up with any doctor. Denies any sick contact, recent travel, clots disorders or malignancy. Iatient remains febrile, to 104F on 7/6. No leukocytosis on admission. Started on Ceftriaxone. ID input requested        Past Medical & Surgical Hx:  No Past Medical History  H/O left knee surgery  No Past Surgical History      Social Hx:  Former smoker      FAMILY HISTORY:  No family history of DM in parents or siblings.        Allergies  No Known Allergies       REVIEW OF SYSTEMS:  CONSTITUTIONAL:  + Fever + chills  HEENT:  No diplopia or blurred vision.  No earache, sore throat or runny nose.  CARDIOVASCULAR:  No pressure, squeezing, strangling, tightness, heaviness or aching about the chest, neck, axilla or epigastrium.  RESPIRATORY:  No cough, shortness of breath  GASTROINTESTINAL:  No nausea, vomiting or diarrhea. + Left sided abdominal pain  GENITOURINARY:  No dysuria, frequency or urgency.   MUSCULOSKELETAL:  no joint aches, no muscle pain  SKIN:  No change in skin, hair or nails.  NEUROLOGIC:  No Headaches, seizures   PSYCHIATRIC:  No disorder of thought or mood.  ENDOCRINE:  No heat or cold intolerance  HEMATOLOGICAL:  No easy bruising or bleeding.       Physical Exam:  GEN: NAD, pleasant  HEENT: normocephalic and atraumatic. EOMI. PERRL.  Anicteric  NECK: Supple.   LUNGS: Clear to auscultation.  HEART: Regular rate and rhythm   ABDOMEN: Soft, and nondistended. + Tenderness left of umbilicus, no rebound or guarding. Positive bowel sounds.    : No CVA tenderness  EXTREMITIES: Without any edema.  MSK: No joint swelling  NEUROLOGIC: No Focal Deficits  PSYCHIATRIC: Appropriate affect .  SKIN: No Rash      Vitals:  T(F): 100.4 (06 Jul 2020 07:54), Max: 104 (06 Jul 2020 03:57)  HR: 89 (06 Jul 2020 07:23)  BP: 109/66 (06 Jul 2020 07:23)  RR: 19 (06 Jul 2020 07:23)  SpO2: 96% (06 Jul 2020 07:23) (92% - 96%)  temp max in last 48H T(F): , Max: 104 (07-06-20 @ 03:57)      Current Antibiotics:  cefTRIAXone   IVPB      cefTRIAXone   IVPB 1000 milliGRAM(s) IV Intermittent every 24 hours      Other medications:  clotrimazole 1% Cream 1 Application(s) Topical two times a day  dextrose 5%. 1000 milliLiter(s) IV Continuous <Continuous>  dextrose 50% Injectable 12.5 Gram(s) IV Push once  dextrose 50% Injectable 25 Gram(s) IV Push once  dextrose 50% Injectable 25 Gram(s) IV Push once  enoxaparin Injectable 40 milliGRAM(s) SubCutaneous every 12 hours  insulin glargine Injectable (LANTUS) 15 Unit(s) SubCutaneous at bedtime  insulin lispro (HumaLOG) corrective regimen sliding scale   SubCutaneous three times a day before meals  insulin lispro (HumaLOG) corrective regimen sliding scale   SubCutaneous at bedtime  insulin lispro Injectable (HumaLOG) 3 Unit(s) SubCutaneous three times a day before meals  sodium chloride 0.9%. 1000 milliLiter(s) IV Continuous <Continuous>      Labs:                        13.7   9.63  )-----------( 228      ( 06 Jul 2020 04:49 )             40.7      07-06    134<L>  |  97<L>  |  6.0<L>  ----------------------------<  246<H>  3.8   |  23.0  |  0.71    Ca    8.7      06 Jul 2020 04:49    TPro  7.3  /  Alb  3.5  /  TBili  0.8  /  DBili  0.2  /  AST  58<H>  /  ALT  98<H>  /  AlkPhos  224<H>  07-06      Culture - Urine (collected 07-05-20 @ 08:46)  Source: .Urine Clean Catch (Midstream)  Final Report (07-06-20 @ 07:19):    No growth      WBC Count: 9.63 K/uL (07-06-20 @ 04:49)  WBC Count: 8.31 K/uL (07-05-20 @ 00:27)    Creatinine, Serum: 0.71 mg/dL (07-06-20 @ 04:49)  Creatinine, Serum: 0.76 mg/dL (07-05-20 @ 00:27)    C-Reactive Protein, Serum: 10.80 mg/dL (07-05-20 @ 06:46)    Ferritin, Serum: 1133 ng/mL (07-06-20 @ 04:49)  Ferritin, Serum: 931 ng/mL (07-05-20 @ 06:46)    Sedimentation Rate, Erythrocyte: 38 mm/hr (07-05-20 @ 06:46)    Procalcitonin, Serum: 0.77 ng/mL (07-05-20 @ 06:46)    COVID-19 PCR: NotDetec (07-05-20 @ 05:31)          < from: CT Abdomen and Pelvis w/ IV Cont (07.05.20 @ 02:05) >  EXAM:  CT ABDOMEN AND PELVIS IC                          PROCEDURE DATE:  07/05/2020      INTERPRETATION:  CT ABDOMEN AND PELVIS WITH CONTRAST    INDICATION: Left lower quadrant abdominal pain.    TECHNIQUE: Contrast enhanced CT of the abdomenand pelvis. Images are reformatted in the sagittal and coronal planes.    90 mL of Omnipaque 350 contrast material was injected IV.    COMPARISON: None.    FINDINGS:    Lower Thorax: No consolidation or effusion.    Liver: Enlarged fatty liver measuring 23 cm in length. Suggestion of fatty sparing at the gallbladder fossa.  Biliary: No significant dilatation. No calcified gallstones within the gallbladder.  Spleen: Enlarged measuring 16 cm in length. Multiple vague wedge-shaped opacities. Findings are nonspecific, likely represent splenic infarcts. Infectious/inflammatory or neoplastic process considered in the differential. Consider nonemergent correlation with MR for better characterization.  Pancreas: No inflammatory changes or ductal dilatation.  Adrenals: Normal.  Kidneys: Symmetrically enhancing without hydronephrosis.  Vessels: Normal caliber. Mild atherosclerotic disease of pelvic vessels.    GI tract: There is wall thickening of distal sigmoid colonic loop extending to the rectumsignificant surrounding stranding. This may be on the basis of inadequate distention or represent proctocolitis in appropriate clinical setting. Consider nonemergent colonoscopy evaluation this concern for underlying lesion. Mild diverticulosis without evidence of acute diverticulitis. No evidence of small bowel obstruction. Normal appendix.    Peritoneum/retroperitoneum and mesentery: No free air. No organized fluid collection. No adenopathy.    Pelvic organs/Bladder: No pelvic masses. Bladder is normal.    Abdominal wall: A small fat containing umbilical hernia is noted.  Bones and soft tissues: Mild multilevel degenerative changes of the spine are noted.    IMPRESSION:    Multiple vague wedge-shaped opacities in enlarged spleen. Findings are nonspecific, likely represent splenic infarcts. Infectious/inflammatory or neoplastic process considered in the differential. Consider nonemergent correlation with MR for better characterization.    Wall thickening of distal sigmoid colonic loop extending to the rectum significant surrounding stranding. This may be on the basis of inadequate distention or represent proctocolitis in appropriate clinical setting. Consider nonemergent colonoscopy evaluation if there concern for underlying lesion. No evidence of small bowel obstruction.     Normal appendix.    < end of copied text >        < from: Xray Chest 1 View-PORTABLE IMMEDIATE (07.04.20 @ 23:57) >  EXAM:  XR CHEST PORTABLE IMMED 1V                          PROCEDURE DATE:  07/04/2020      INTERPRETATION:  Clinical Information: Sepsis    Technique: AP chest image.     Comparison: None    Findings/  Impression: The heart is unremarkable. The lungs are clear. Bones are unremarkable for age.    < end of copied text >

## 2020-07-06 NOTE — CONSULT NOTE ADULT - ASSESSMENT
40y  Male with h/o Uncontrolled DM II. Patient presented to the ER 7/5 with c/o abdominal pain associated with fevers which started 7/3 night. Symptoms progressively worsened. The Abdominal pain is located in left side abdominal wall, non radiating, cramping like pain. Patient took Advil with no improvement in symptoms. He has been diagnosed with DM II many years ago but is not on any treatment or follows up with any doctor. Denies any sick contact, recent travel, clots disorders or malignancy. Iatient remains febrile, to 104F on 7/6. No leukocytosis on admission. Started on Ceftriaxone.       Fever  Abdominal pain  Splenic infarcts   Uncontrolled DM II        - Blood cultures pending  - Repeat blood cultures if febrile   - COVID 19 PCR pending  - CT A/P with splenic infarcts  - CXR reporting no pneumonia   - UA with no pyuria   - Procalcitonin level 0.77  - Will check TTE  - Check US venus duplex  - Continue Ceftriaxone   - Trend Fever  - Trend Leukocytosis      Will Follow

## 2020-07-06 NOTE — PROGRESS NOTE ADULT - SUBJECTIVE AND OBJECTIVE BOX
INTERVAL HPI/OVERNIGHT EVENTS:    CC: SIRS r/o sepsis, uncontrolled diabetes mellitus, splenic infarct      Febrile overnight, normal appetite, abdominal pain better. No diarrhea.    Vital Signs Last 24 Hrs  T(C): 38 (2020 07:54), Max: 40 (2020 03:57)  T(F): 100.4 (2020 07:54), Max: 104 (2020 03:57)  HR: 89 (2020 07:23) (89 - 112)  BP: 109/66 (2020 07:23) (109/66 - 133/88)  BP(mean): --  RR: 19 (2020 07:23) (18 - 22)  SpO2: 96% (2020 07:23) (92% - 96%)    PHYSICAL EXAM:    GENERAL: alert, not in distress, skin warm  CHEST/LUNG: b/l air entry  HEART: regular  ABDOMEN: soft, bs+  EXTREMITIES:  no edema, tenderness    MEDICATIONS  (STANDING):  cefTRIAXone   IVPB      cefTRIAXone   IVPB 1000 milliGRAM(s) IV Intermittent every 24 hours  clotrimazole 1% Cream 1 Application(s) Topical two times a day  dextrose 5%. 1000 milliLiter(s) (50 mL/Hr) IV Continuous <Continuous>  dextrose 50% Injectable 12.5 Gram(s) IV Push once  dextrose 50% Injectable 25 Gram(s) IV Push once  dextrose 50% Injectable 25 Gram(s) IV Push once  enoxaparin Injectable 40 milliGRAM(s) SubCutaneous every 12 hours  insulin glargine Injectable (LANTUS) 15 Unit(s) SubCutaneous at bedtime  insulin lispro (HumaLOG) corrective regimen sliding scale   SubCutaneous three times a day before meals  insulin lispro (HumaLOG) corrective regimen sliding scale   SubCutaneous at bedtime  insulin lispro Injectable (HumaLOG) 3 Unit(s) SubCutaneous three times a day before meals  sodium chloride 0.9%. 1000 milliLiter(s) (100 mL/Hr) IV Continuous <Continuous>    MEDICATIONS  (PRN):  acetaminophen   Tablet .. 650 milliGRAM(s) Oral every 4 hours PRN Temp greater or equal to 38C (100.4F), Mild Pain (1 - 3)  dextrose 40% Gel 15 Gram(s) Oral once PRN Blood Glucose LESS THAN 70 milliGRAM(s)/deciliter  glucagon  Injectable 1 milliGRAM(s) IntraMuscular once PRN Glucose LESS THAN 70 milligrams/deciliter  ondansetron Injectable 4 milliGRAM(s) IV Push every 6 hours PRN Nausea      Allergies    No Known Allergies    Intolerances          LABS:                          13.7   9.63  )-----------( 228      ( 2020 04:49 )             40.7     07-06    134<L>  |  97<L>  |  6.0<L>  ----------------------------<  246<H>  3.8   |  23.0  |  0.71    Ca    8.7      2020 04:49    TPro  7.3  /  Alb  3.5  /  TBili  0.8  /  DBili  0.2  /  AST  58<H>  /  ALT  98<H>  /  AlkPhos  224<H>  07-06    PT/INR - ( 2020 00:27 )   PT: 13.5 sec;   INR: 1.17 ratio         PTT - ( 2020 00:27 )  PTT:35.6 sec  Urinalysis Basic - ( 2020 03:53 )    Color: Yellow / Appearance: Clear / S.005 / pH: x  Gluc: x / Ketone: Negative  / Bili: Negative / Urobili: Negative   Blood: x / Protein: 30 mg/dL / Nitrite: Positive   Leuk Esterase: Trace / RBC: 0-2 /HPF / WBC 3-5   Sq Epi: x / Non Sq Epi: x / Bacteria: x        RADIOLOGY & ADDITIONAL TESTS:

## 2020-07-07 LAB
ALBUMIN SERPL ELPH-MCNC: 3.4 G/DL — SIGNIFICANT CHANGE UP (ref 3.3–5.2)
ALP SERPL-CCNC: 224 U/L — HIGH (ref 40–120)
ALT FLD-CCNC: 95 U/L — HIGH
ANION GAP SERPL CALC-SCNC: 13 MMOL/L — SIGNIFICANT CHANGE UP (ref 5–17)
AST SERPL-CCNC: 50 U/L — HIGH
BILIRUB DIRECT SERPL-MCNC: 0.1 MG/DL — SIGNIFICANT CHANGE UP (ref 0–0.3)
BILIRUB INDIRECT FLD-MCNC: 0.5 MG/DL — SIGNIFICANT CHANGE UP (ref 0.2–1)
BILIRUB SERPL-MCNC: 0.6 MG/DL — SIGNIFICANT CHANGE UP (ref 0.4–2)
BUN SERPL-MCNC: 8 MG/DL — SIGNIFICANT CHANGE UP (ref 8–20)
CALCIUM SERPL-MCNC: 8.9 MG/DL — SIGNIFICANT CHANGE UP (ref 8.6–10.2)
CHLORIDE SERPL-SCNC: 95 MMOL/L — LOW (ref 98–107)
CO2 SERPL-SCNC: 27 MMOL/L — SIGNIFICANT CHANGE UP (ref 22–29)
CREAT SERPL-MCNC: 0.75 MG/DL — SIGNIFICANT CHANGE UP (ref 0.5–1.3)
FERRITIN SERPL-MCNC: 1487 NG/ML — HIGH (ref 30–400)
GLUCOSE BLDC GLUCOMTR-MCNC: 191 MG/DL — HIGH (ref 70–99)
GLUCOSE BLDC GLUCOMTR-MCNC: 202 MG/DL — HIGH (ref 70–99)
GLUCOSE BLDC GLUCOMTR-MCNC: 217 MG/DL — HIGH (ref 70–99)
GLUCOSE BLDC GLUCOMTR-MCNC: 237 MG/DL — HIGH (ref 70–99)
GLUCOSE SERPL-MCNC: 203 MG/DL — HIGH (ref 70–99)
HCT VFR BLD CALC: 42.2 % — SIGNIFICANT CHANGE UP (ref 39–50)
HGB BLD-MCNC: 13.7 G/DL — SIGNIFICANT CHANGE UP (ref 13–17)
LDH SERPL L TO P-CCNC: 384 U/L — HIGH (ref 98–192)
MAGNESIUM SERPL-MCNC: 2.2 MG/DL — SIGNIFICANT CHANGE UP (ref 1.6–2.6)
MCHC RBC-ENTMCNC: 29.6 PG — SIGNIFICANT CHANGE UP (ref 27–34)
MCHC RBC-ENTMCNC: 32.5 GM/DL — SIGNIFICANT CHANGE UP (ref 32–36)
MCV RBC AUTO: 91.1 FL — SIGNIFICANT CHANGE UP (ref 80–100)
PHOSPHATE SERPL-MCNC: 3.2 MG/DL — SIGNIFICANT CHANGE UP (ref 2.4–4.7)
PLATELET # BLD AUTO: 303 K/UL — SIGNIFICANT CHANGE UP (ref 150–400)
POTASSIUM SERPL-MCNC: 4.1 MMOL/L — SIGNIFICANT CHANGE UP (ref 3.5–5.3)
POTASSIUM SERPL-SCNC: 4.1 MMOL/L — SIGNIFICANT CHANGE UP (ref 3.5–5.3)
PROCALCITONIN SERPL-MCNC: 0.75 NG/ML — HIGH (ref 0.02–0.1)
PROT SERPL-MCNC: 7.5 G/DL — SIGNIFICANT CHANGE UP (ref 6.6–8.7)
RBC # BLD: 4.63 M/UL — SIGNIFICANT CHANGE UP (ref 4.2–5.8)
RBC # FLD: 13 % — SIGNIFICANT CHANGE UP (ref 10.3–14.5)
SARS-COV-2 RNA SPEC QL NAA+PROBE: SIGNIFICANT CHANGE UP
SODIUM SERPL-SCNC: 135 MMOL/L — SIGNIFICANT CHANGE UP (ref 135–145)
WBC # BLD: 8.17 K/UL — SIGNIFICANT CHANGE UP (ref 3.8–10.5)
WBC # FLD AUTO: 8.17 K/UL — SIGNIFICANT CHANGE UP (ref 3.8–10.5)

## 2020-07-07 PROCEDURE — 99233 SBSQ HOSP IP/OBS HIGH 50: CPT

## 2020-07-07 RX ADMIN — Medication 6 UNIT(S): at 11:53

## 2020-07-07 RX ADMIN — Medication 2: at 11:54

## 2020-07-07 RX ADMIN — Medication 6 UNIT(S): at 17:04

## 2020-07-07 RX ADMIN — Medication 1 APPLICATION(S): at 05:10

## 2020-07-07 RX ADMIN — ENOXAPARIN SODIUM 40 MILLIGRAM(S): 100 INJECTION SUBCUTANEOUS at 17:05

## 2020-07-07 RX ADMIN — Medication 650 MILLIGRAM(S): at 05:09

## 2020-07-07 RX ADMIN — Medication 1 APPLICATION(S): at 17:05

## 2020-07-07 RX ADMIN — Medication 6 UNIT(S): at 07:38

## 2020-07-07 RX ADMIN — ENOXAPARIN SODIUM 40 MILLIGRAM(S): 100 INJECTION SUBCUTANEOUS at 05:09

## 2020-07-07 RX ADMIN — Medication 2: at 07:38

## 2020-07-07 RX ADMIN — Medication 1: at 17:04

## 2020-07-07 RX ADMIN — CEFTRIAXONE 100 MILLIGRAM(S): 500 INJECTION, POWDER, FOR SOLUTION INTRAMUSCULAR; INTRAVENOUS at 05:09

## 2020-07-07 RX ADMIN — INSULIN GLARGINE 20 UNIT(S): 100 INJECTION, SOLUTION SUBCUTANEOUS at 21:15

## 2020-07-07 NOTE — PROGRESS NOTE ADULT - SUBJECTIVE AND OBJECTIVE BOX
[facility]  INFECTIOUS DISEASES AND INTERNAL MEDICINE at [place]  =======================================================  Jose Luis Pedro MD  Diplomates American Board of Internal Medicine and Infectious Diseases  Tel: [phone]      Fax: [phone]  =======================================================    ROHINI SINGH [id number]    Seen with     Follow up: Fever    No fevers since yesterday     No complaints today     Allergies:  No Known Allergies       REVIEW OF SYSTEMS:  CONSTITUTIONAL:  No Fever + chills  HEENT:  No diplopia or blurred vision.  No earache, sore throat or runny nose.  CARDIOVASCULAR:  No pressure, squeezing, strangling, tightness, heaviness or aching about the chest, neck, axilla or epigastrium.  RESPIRATORY:  No cough, shortness of breath  GASTROINTESTINAL:  No nausea, vomiting or diarrhea. + Left sided abdominal pain improved   GENITOURINARY:  No dysuria, frequency or urgency.   MUSCULOSKELETAL:  no joint aches, no muscle pain  SKIN:  No change in skin, hair or nails.  NEUROLOGIC:  No Headaches, seizures   PSYCHIATRIC:  No disorder of thought or mood.  ENDOCRINE:  No heat or cold intolerance  HEMATOLOGICAL:  No easy bruising or bleeding.       Physical Exam:  GEN: NAD, pleasant  HEENT: normocephalic and atraumatic. EOMI. PERRL.  Anicteric  NECK: Supple.   LUNGS: Clear to auscultation.  HEART: Regular rate and rhythm   ABDOMEN: Soft, and nondistended. No Tenderness, no rebound or guarding. Positive bowel sounds.    : No CVA tenderness  EXTREMITIES: Without any edema.  MSK: No joint swelling  NEUROLOGIC: No Focal Deficits  PSYCHIATRIC: Appropriate affect .  SKIN: No Rash        Vitals:  T(F): 99.1 (07 Jul 2020 07:29), Max: 99.2 (06 Jul 2020 16:25)  HR: 86 (07 Jul 2020 07:29)  BP: 101/64 (07 Jul 2020 07:29)  RR: 18 (07 Jul 2020 07:29)  SpO2: 94% (07 Jul 2020 07:29) (94% - 95%)  temp max in last 48H T(F): , Max: 104 (07-06-20 @ 03:57)      Current Antibiotics:  cefTRIAXone   IVPB      cefTRIAXone   IVPB 1000 milliGRAM(s) IV Intermittent every 24 hours    Other medications:  clotrimazole 1% Cream 1 Application(s) Topical two times a day  dextrose 5%. 1000 milliLiter(s) IV Continuous <Continuous>  dextrose 50% Injectable 12.5 Gram(s) IV Push once  dextrose 50% Injectable 25 Gram(s) IV Push once  dextrose 50% Injectable 25 Gram(s) IV Push once  enoxaparin Injectable 40 milliGRAM(s) SubCutaneous every 12 hours  insulin glargine Injectable (LANTUS) 20 Unit(s) SubCutaneous at bedtime  insulin lispro (HumaLOG) corrective regimen sliding scale   SubCutaneous three times a day before meals  insulin lispro (HumaLOG) corrective regimen sliding scale   SubCutaneous at bedtime  insulin lispro Injectable (HumaLOG) 6 Unit(s) SubCutaneous three times a day with meals  sodium chloride 0.9%. 1000 milliLiter(s) IV Continuous <Continuous>        Labs:                        13.7   8.17  )-----------( 303      ( 07 Jul 2020 07:27 )             42.2      07-07    135  |  95<L>  |  8.0  ----------------------------<  203<H>  4.1   |  27.0  |  0.75    Ca    8.9      07 Jul 2020 07:27  Phos  3.2     07-07  Mg     2.2     07-07    TPro  7.3  /  Alb  3.5  /  TBili  0.8  /  DBili  0.2  /  AST  58<H>  /  ALT  98<H>  /  AlkPhos  224<H>  07-06      Culture - Urine (collected 07-05-20 @ 08:46)  Source: .Urine Clean Catch (Midstream)  Final Report (07-06-20 @ 07:19):    No growth    Culture - Blood (collected 07-05-20 @ 00:29)  Source: .Blood Blood-Peripheral    Culture - Blood (collected 07-05-20 @ 00:29)  Source: .Blood Blood-Peripheral    WBC Count: 8.17 K/uL (07-07-20 @ 07:27)  WBC Count: 9.63 K/uL (07-06-20 @ 04:49)  WBC Count: 8.31 K/uL (07-05-20 @ 00:27)    Creatinine, Serum: 0.75 mg/dL (07-07-20 @ 07:27)  Creatinine, Serum: 0.71 mg/dL (07-06-20 @ 04:49)  Creatinine, Serum: 0.76 mg/dL (07-05-20 @ 00:27)    C-Reactive Protein, Serum: 10.80 mg/dL (07-05-20 @ 06:46)    Ferritin, Serum: 1133 ng/mL (07-06-20 @ 04:49)  Ferritin, Serum: 931 ng/mL (07-05-20 @ 06:46)    Sedimentation Rate, Erythrocyte: 38 mm/hr (07-05-20 @ 06:46)    Procalcitonin, Serum: 0.75 ng/mL (07-07-20 @ 07:27)  Procalcitonin, Serum: 0.77 ng/mL (07-05-20 @ 06:46)    COVID-19 PCR: NotDetec (07-06-20 @ 11:15)  COVID-19 PCR: NotDetec (07-05-20 @ 05:31)        < from: US Duplex Venous Lower Ext Complete, Bilateral (07.06.20 @ 15:25) >  EXAM:  US DPLX LWR EXT VEINS COMPL BI                          PROCEDURE DATE:  07/06/2020      INTERPRETATION:  CLINICAL INFORMATION: Fever. Left leg pain.    COMPARISON: None available.    TECHNIQUE: Duplex sonography of the BILATERAL LOWER extremity veins with color and spectral Doppler, with and without compression.      FINDINGS:    There is normal compressibility of the bilateral common femoral, femoral and popliteal veins.   Doppler examination shows normal spontaneous and phasic flow.    No calf vein thrombosis is detected.    IMPRESSION:   No evidence of deep venous thrombosis in either lower extremity.    < end of copied text >      < from: CT Abdomen and Pelvis w/ IV Cont (07.05.20 @ 02:05) >   EXAM:  CT ABDOMEN AND PELVIS IC                          PROCEDURE DATE:  07/05/2020      INTERPRETATION:  CT ABDOMEN AND PELVIS WITH CONTRAST    INDICATION: Left lower quadrant abdominal pain.    TECHNIQUE: Contrast enhanced CT of the abdomenand pelvis. Images are reformatted in the sagittal and coronal planes.    90 mL of Omnipaque 350 contrast material was injected IV.    COMPARISON: None.    FINDINGS:    Lower Thorax: No consolidation or effusion.    Liver: Enlarged fatty liver measuring 23 cm in length. Suggestion of fatty sparing at the gallbladder fossa.  Biliary: No significant dilatation. No calcified gallstones within the gallbladder.  Spleen: Enlarged measuring 16 cm in length. Multiple vague wedge-shaped opacities. Findings are nonspecific, likely represent splenic infarcts. Infectious/inflammatory or neoplastic process considered in the differential. Consider nonemergent correlation with MR for better characterization.  Pancreas: No inflammatory changes or ductal dilatation.  Adrenals: Normal.  Kidneys: Symmetrically enhancing without hydronephrosis.  Vessels: Normal caliber. Mild atherosclerotic disease of pelvic vessels.    GI tract: There is wall thickening of distal sigmoid colonic loop extending to the rectumsignificant surrounding stranding. This may be on the basis of inadequate distention or represent proctocolitis in appropriate clinical setting. Consider nonemergent colonoscopy evaluation this concern for underlying lesion. Mild diverticulosis without evidence of acute diverticulitis. No evidence of small bowel obstruction. Normal appendix.    Peritoneum/retroperitoneum and mesentery: No free air. No organized fluid collection. No adenopathy.    Pelvic organs/Bladder: No pelvic masses. Bladder is normal.    Abdominal wall: A small fat containing umbilical hernia is noted.  Bones and soft tissues: Mild multilevel degenerative changes of the spine are noted.    IMPRESSION:    Multiple vague wedge-shaped opacities in enlarged spleen. Findings are nonspecific, likely represent splenic infarcts. Infectious/inflammatory or neoplastic process considered in the differential. Consider nonemergent correlation with MR for better characterization.    Wall thickening of distal sigmoid colonic loop extending to the rectum significant surrounding stranding. This may be on the basis of inadequate distention or represent proctocolitis in appropriate clinical setting. Consider nonemergent colonoscopy evaluation if there concern for underlying lesion. No evidence of small bowel obstruction.     Normal appendix.    < end of copied text >

## 2020-07-07 NOTE — PROGRESS NOTE ADULT - SUBJECTIVE AND OBJECTIVE BOX
INTERVAL HPI/OVERNIGHT EVENTS:    CC:  SIRS r/o sepsis, uncontrolled diabetes mellitus, splenic infarct      No fever, feels better, abdominal pain resolved. No diarrhea. Explained A1C results and need for insulin on discharge.    Vital Signs Last 24 Hrs  T(C): 37.3 (07 Jul 2020 07:29), Max: 37.3 (06 Jul 2020 16:25)  T(F): 99.1 (07 Jul 2020 07:29), Max: 99.2 (06 Jul 2020 16:25)  HR: 86 (07 Jul 2020 07:29) (82 - 86)  BP: 101/64 (07 Jul 2020 07:29) (101/64 - 106/67)  BP(mean): --  RR: 18 (07 Jul 2020 07:29) (18 - 18)  SpO2: 94% (07 Jul 2020 07:29) (94% - 95%)    PHYSICAL EXAM:    GENERAL: alert, not in distress  CHEST/LUNG: b/l air entry  HEART: regular  ABDOMEN: soft, non tender, BS+  EXTREMITIES:  no edema, tenderness    MEDICATIONS  (STANDING):  cefTRIAXone   IVPB      cefTRIAXone   IVPB 1000 milliGRAM(s) IV Intermittent every 24 hours  clotrimazole 1% Cream 1 Application(s) Topical two times a day  dextrose 5%. 1000 milliLiter(s) (50 mL/Hr) IV Continuous <Continuous>  dextrose 50% Injectable 12.5 Gram(s) IV Push once  dextrose 50% Injectable 25 Gram(s) IV Push once  dextrose 50% Injectable 25 Gram(s) IV Push once  enoxaparin Injectable 40 milliGRAM(s) SubCutaneous every 12 hours  insulin glargine Injectable (LANTUS) 20 Unit(s) SubCutaneous at bedtime  insulin lispro (HumaLOG) corrective regimen sliding scale   SubCutaneous three times a day before meals  insulin lispro (HumaLOG) corrective regimen sliding scale   SubCutaneous at bedtime  insulin lispro Injectable (HumaLOG) 6 Unit(s) SubCutaneous three times a day with meals  sodium chloride 0.9%. 1000 milliLiter(s) (100 mL/Hr) IV Continuous <Continuous>    MEDICATIONS  (PRN):  acetaminophen   Tablet .. 650 milliGRAM(s) Oral every 4 hours PRN Temp greater or equal to 38C (100.4F), Mild Pain (1 - 3)  dextrose 40% Gel 15 Gram(s) Oral once PRN Blood Glucose LESS THAN 70 milliGRAM(s)/deciliter  glucagon  Injectable 1 milliGRAM(s) IntraMuscular once PRN Glucose LESS THAN 70 milligrams/deciliter  ondansetron Injectable 4 milliGRAM(s) IV Push every 6 hours PRN Nausea      Allergies    No Known Allergies    Intolerances          LABS:                          13.7   8.17  )-----------( 303      ( 07 Jul 2020 07:27 )             42.2     07-07    135  |  95<L>  |  8.0  ----------------------------<  203<H>  4.1   |  27.0  |  0.75    Ca    8.9      07 Jul 2020 07:27  Phos  3.2     07-07  Mg     2.2     07-07    TPro  7.5  /  Alb  3.4  /  TBili  0.6  /  DBili  0.1  /  AST  50<H>  /  ALT  95<H>  /  AlkPhos  224<H>  07-07          RADIOLOGY & ADDITIONAL TESTS:

## 2020-07-08 ENCOUNTER — TRANSCRIPTION ENCOUNTER (OUTPATIENT)
Age: 40
End: 2020-07-08

## 2020-07-08 DIAGNOSIS — D47.3 ESSENTIAL (HEMORRHAGIC) THROMBOCYTHEMIA: ICD-10-CM

## 2020-07-08 LAB
ALBUMIN SERPL ELPH-MCNC: 3.5 G/DL — SIGNIFICANT CHANGE UP (ref 3.3–5.2)
ALP SERPL-CCNC: 239 U/L — HIGH (ref 40–120)
ALT FLD-CCNC: 166 U/L — HIGH
ANION GAP SERPL CALC-SCNC: 14 MMOL/L — SIGNIFICANT CHANGE UP (ref 5–17)
AST SERPL-CCNC: 127 U/L — HIGH
BASOPHILS # BLD AUTO: 0.05 K/UL — SIGNIFICANT CHANGE UP (ref 0–0.2)
BASOPHILS NFR BLD AUTO: 0.7 % — SIGNIFICANT CHANGE UP (ref 0–2)
BILIRUB SERPL-MCNC: 0.4 MG/DL — SIGNIFICANT CHANGE UP (ref 0.4–2)
BUN SERPL-MCNC: 12 MG/DL — SIGNIFICANT CHANGE UP (ref 8–20)
CALCIUM SERPL-MCNC: 9.2 MG/DL — SIGNIFICANT CHANGE UP (ref 8.6–10.2)
CHLORIDE SERPL-SCNC: 98 MMOL/L — SIGNIFICANT CHANGE UP (ref 98–107)
CO2 SERPL-SCNC: 26 MMOL/L — SIGNIFICANT CHANGE UP (ref 22–29)
CREAT SERPL-MCNC: 0.75 MG/DL — SIGNIFICANT CHANGE UP (ref 0.5–1.3)
EBV EA AB SER IA-ACNC: <5 U/ML — SIGNIFICANT CHANGE UP
EBV EA AB TITR SER IF: POSITIVE
EBV EA IGG SER-ACNC: NEGATIVE — SIGNIFICANT CHANGE UP
EBV NA IGG SER IA-ACNC: >600 U/ML — HIGH
EBV PATRN SPEC IB-IMP: SIGNIFICANT CHANGE UP
EBV VCA IGG AVIDITY SER QL IA: POSITIVE
EBV VCA IGM SER IA-ACNC: 135 U/ML — HIGH
EBV VCA IGM SER IA-ACNC: >750 U/ML — HIGH
EBV VCA IGM TITR FLD: POSITIVE
EOSINOPHIL # BLD AUTO: 0.19 K/UL — SIGNIFICANT CHANGE UP (ref 0–0.5)
EOSINOPHIL NFR BLD AUTO: 2.7 % — SIGNIFICANT CHANGE UP (ref 0–6)
GLUCOSE BLDC GLUCOMTR-MCNC: 152 MG/DL — HIGH (ref 70–99)
GLUCOSE BLDC GLUCOMTR-MCNC: 152 MG/DL — HIGH (ref 70–99)
GLUCOSE BLDC GLUCOMTR-MCNC: 156 MG/DL — HIGH (ref 70–99)
GLUCOSE BLDC GLUCOMTR-MCNC: 184 MG/DL — HIGH (ref 70–99)
GLUCOSE SERPL-MCNC: 161 MG/DL — HIGH (ref 70–99)
HAV IGM SER-ACNC: SIGNIFICANT CHANGE UP
HBV CORE IGM SER-ACNC: ABNORMAL
HBV SURFACE AG SER-ACNC: SIGNIFICANT CHANGE UP
HCT VFR BLD CALC: 41.6 % — SIGNIFICANT CHANGE UP (ref 39–50)
HCV AB S/CO SERPL IA: 0.48 S/CO — SIGNIFICANT CHANGE UP (ref 0–0.99)
HCV AB SERPL-IMP: SIGNIFICANT CHANGE UP
HETEROPH AB TITR SER AGGL: NEGATIVE — SIGNIFICANT CHANGE UP
HGB BLD-MCNC: 13.7 G/DL — SIGNIFICANT CHANGE UP (ref 13–17)
HIV 1 & 2 AB SERPL IA.RAPID: SIGNIFICANT CHANGE UP
IMM GRANULOCYTES NFR BLD AUTO: 1 % — SIGNIFICANT CHANGE UP (ref 0–1.5)
LYMPHOCYTES # BLD AUTO: 3.07 K/UL — SIGNIFICANT CHANGE UP (ref 1–3.3)
LYMPHOCYTES # BLD AUTO: 43.9 % — SIGNIFICANT CHANGE UP (ref 13–44)
MCHC RBC-ENTMCNC: 29.8 PG — SIGNIFICANT CHANGE UP (ref 27–34)
MCHC RBC-ENTMCNC: 32.9 GM/DL — SIGNIFICANT CHANGE UP (ref 32–36)
MCV RBC AUTO: 90.4 FL — SIGNIFICANT CHANGE UP (ref 80–100)
MONOCYTES # BLD AUTO: 0.53 K/UL — SIGNIFICANT CHANGE UP (ref 0–0.9)
MONOCYTES NFR BLD AUTO: 7.6 % — SIGNIFICANT CHANGE UP (ref 2–14)
NEUTROPHILS # BLD AUTO: 3.08 K/UL — SIGNIFICANT CHANGE UP (ref 1.8–7.4)
NEUTROPHILS NFR BLD AUTO: 44.1 % — SIGNIFICANT CHANGE UP (ref 43–77)
PLATELET # BLD AUTO: 418 K/UL — HIGH (ref 150–400)
POTASSIUM SERPL-MCNC: 4.1 MMOL/L — SIGNIFICANT CHANGE UP (ref 3.5–5.3)
POTASSIUM SERPL-SCNC: 4.1 MMOL/L — SIGNIFICANT CHANGE UP (ref 3.5–5.3)
PROT SERPL-MCNC: 7.6 G/DL — SIGNIFICANT CHANGE UP (ref 6.6–8.7)
RBC # BLD: 4.6 M/UL — SIGNIFICANT CHANGE UP (ref 4.2–5.8)
RBC # FLD: 12.8 % — SIGNIFICANT CHANGE UP (ref 10.3–14.5)
SODIUM SERPL-SCNC: 138 MMOL/L — SIGNIFICANT CHANGE UP (ref 135–145)
WBC # BLD: 6.99 K/UL — SIGNIFICANT CHANGE UP (ref 3.8–10.5)
WBC # FLD AUTO: 6.99 K/UL — SIGNIFICANT CHANGE UP (ref 3.8–10.5)

## 2020-07-08 PROCEDURE — 99223 1ST HOSP IP/OBS HIGH 75: CPT

## 2020-07-08 PROCEDURE — 99233 SBSQ HOSP IP/OBS HIGH 50: CPT

## 2020-07-08 PROCEDURE — 99232 SBSQ HOSP IP/OBS MODERATE 35: CPT

## 2020-07-08 PROCEDURE — 99222 1ST HOSP IP/OBS MODERATE 55: CPT

## 2020-07-08 RX ORDER — NYSTATIN CREAM 100000 [USP'U]/G
1 CREAM TOPICAL EVERY 8 HOURS
Refills: 0 | Status: DISCONTINUED | OUTPATIENT
Start: 2020-07-08 | End: 2020-07-09

## 2020-07-08 RX ADMIN — Medication 1: at 17:35

## 2020-07-08 RX ADMIN — ENOXAPARIN SODIUM 40 MILLIGRAM(S): 100 INJECTION SUBCUTANEOUS at 17:36

## 2020-07-08 RX ADMIN — CEFTRIAXONE 100 MILLIGRAM(S): 500 INJECTION, POWDER, FOR SOLUTION INTRAMUSCULAR; INTRAVENOUS at 05:01

## 2020-07-08 RX ADMIN — ENOXAPARIN SODIUM 40 MILLIGRAM(S): 100 INJECTION SUBCUTANEOUS at 05:00

## 2020-07-08 RX ADMIN — Medication 1 APPLICATION(S): at 17:35

## 2020-07-08 RX ADMIN — Medication 6 UNIT(S): at 17:35

## 2020-07-08 RX ADMIN — Medication 1: at 08:47

## 2020-07-08 RX ADMIN — NYSTATIN CREAM 1 APPLICATION(S): 100000 CREAM TOPICAL at 17:35

## 2020-07-08 RX ADMIN — Medication 1 APPLICATION(S): at 05:00

## 2020-07-08 RX ADMIN — NYSTATIN CREAM 1 APPLICATION(S): 100000 CREAM TOPICAL at 22:22

## 2020-07-08 RX ADMIN — Medication 1: at 11:51

## 2020-07-08 RX ADMIN — Medication 6 UNIT(S): at 08:47

## 2020-07-08 RX ADMIN — Medication 6 UNIT(S): at 11:51

## 2020-07-08 RX ADMIN — INSULIN GLARGINE 20 UNIT(S): 100 INJECTION, SOLUTION SUBCUTANEOUS at 22:20

## 2020-07-08 NOTE — CONSULT NOTE ADULT - ASSESSMENT
41yo M w/ uncontrolled DM2 a/w left-sided flank pain, found to have multiple likely splenic infarcts.

## 2020-07-08 NOTE — DISCHARGE NOTE NURSING/CASE MANAGEMENT/SOCIAL WORK - PATIENT PORTAL LINK FT
You can access the FollowMyHealth Patient Portal offered by [facility] by registering at the following website: [email or web address]. By joining [facility]’s FollowMyHealth portal, you will also be able to view your health information using other applications (apps) compatible with our system.

## 2020-07-08 NOTE — CONSULT NOTE ADULT - PROBLEM SELECTOR RECOMMENDATION 2
-Mild thrombocytosis is non-specific  -Appears to be reactive in the setting of acute illness. Monitor      c/w balanitis tx as per ID, diabetes mgmt as per endo.     He has been given my office information for outpatient follow-up, where we can complete evaluation.    d/w Dr. Toure

## 2020-07-08 NOTE — CONSULT NOTE ADULT - SUBJECTIVE AND OBJECTIVE BOX
[facility]                                                         [address] [place] [address], [address]                                                           [place], [place] [id number] [phone]                Tami Duarte, Haim Araujo, Latha Gan, David Pereira,  Erlin Allen,                                                   Johnna Escudero and Buck Aden         # [id number]    HPI:  40y M with PMHx significant for Uncontrolled DM II, presents c/o abdominal pain. States, pain started 2 days ago, but has progressively worsened, is located in left side abdominal wall, non radiated, cramping like pain. Additionally endorses subjective fever, malaise, nausea w/o vomiting, took Advil but not improvement was noted.  Also, reports redness and itchiness at the tip of his penis. He has been diagnosed with DM II many years ago but is not on any treatment or follows up with any doctor   Denies any sick contact, recent travel, clots disorders or malignancy (05 Jul 2020 04:41).    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 A1C of 13.3.    To better define the etiology of his infarcts, he underwent LE Dopplers, which were negative. COVID PCR and Ab testing also negative. Blood and urine cultures negative. TTE was negative for an vegetations or thrombi.    He now feels well, reports no pain, on long and short acting insulins.      Allergies    No Known Allergies    Intolerances        MEDICATIONS  (STANDING):  cefTRIAXone   IVPB      cefTRIAXone   IVPB 1000 milliGRAM(s) IV Intermittent every 24 hours  clotrimazole 1% Cream 1 Application(s) Topical two times a day  dextrose 5%. 1000 milliLiter(s) (50 mL/Hr) IV Continuous <Continuous>  dextrose 50% Injectable 12.5 Gram(s) IV Push once  dextrose 50% Injectable 25 Gram(s) IV Push once  dextrose 50% Injectable 25 Gram(s) IV Push once  enoxaparin Injectable 40 milliGRAM(s) SubCutaneous every 12 hours  insulin glargine Injectable (LANTUS) 20 Unit(s) SubCutaneous at bedtime  insulin lispro (HumaLOG) corrective regimen sliding scale   SubCutaneous three times a day before meals  insulin lispro (HumaLOG) corrective regimen sliding scale   SubCutaneous at bedtime  insulin lispro Injectable (HumaLOG) 6 Unit(s) SubCutaneous three times a day with meals  nystatin Powder 1 Application(s) Topical every 8 hours  sodium chloride 0.9%. 1000 milliLiter(s) (100 mL/Hr) IV Continuous <Continuous>    MEDICATIONS  (PRN):  acetaminophen   Tablet .. 650 milliGRAM(s) Oral every 4 hours PRN Temp greater or equal to 38C (100.4F), Mild Pain (1 - 3)  dextrose 40% Gel 15 Gram(s) Oral once PRN Blood Glucose LESS THAN 70 milliGRAM(s)/deciliter  glucagon  Injectable 1 milliGRAM(s) IntraMuscular once PRN Glucose LESS THAN 70 milligrams/deciliter  ondansetron Injectable 4 milliGRAM(s) IV Push every 6 hours PRN Nausea      PAST MEDICAL & SURGICAL HISTORY:  No Past Medical History  H/O left knee surgery  No Past Surgical History      FAMILY HISTORY:  No pertinent family history in first degree relatives      SOCIAL HISTORY: Social EtOH, no tobacco    REVIEW OF SYSTEMS:    CONSTITUTIONAL: No weakness, fevers or chills  EYES/ENT: No visual changes;  No vertigo or throat pain   NECK: No pain or stiffness  RESPIRATORY: No cough, wheezing, hemoptysis; No shortness of breath  CARDIOVASCULAR: No chest pain or palpitations  GASTROINTESTINAL: No abdominal or epigastric pain. No nausea, vomiting, or hematemesis; No diarrhea or constipation. No melena or hematochezia.  GENITOURINARY: +penile pain  NEUROLOGICAL: No numbness or weakness  SKIN: No itching, burning, rashes, or lesions   All other review of systems is negative unless indicated above.        T(F): 98.7 (07-08-20 @ 15:36), Max: 98.7 (07-08-20 @ 15:36)  HR: 87 (07-08-20 @ 15:36)  BP: 117/82 (07-08-20 @ 15:36)  RR: 19 (07-08-20 @ 15:36)  SpO2: 94% (07-08-20 @ 15:36)  Wt(kg): --    GENERAL: NAD, well-developed, obese  HEAD:  Atraumatic, Normocephalic  EYES: conjunctiva and sclera clear  NECK: Supple, No JVD  CHEST/LUNG: Clear to auscultation bilaterally; No wheeze  HEART: Regular rate and rhythm; No murmurs, rubs, or gallops  ABDOMEN: Soft, Nontender, Nondistended; Bowel sounds present  EXTREMITIES:  2+ Peripheral Pulses, No clubbing, cyanosis, or edema  NEUROLOGY: non-focal  SKIN: No rashes or lesions                          13.7   6.99  )-----------( 418      ( 08 Jul 2020 11:53 )             41.6       07-08    138  |  98  |  12.0  ----------------------------<  161<H>  4.1   |  26.0  |  0.75    Ca    9.2      08 Jul 2020 11:53  Phos  3.2     07-07  Mg     2.2     07-07    TPro  7.6  /  Alb  3.5  /  TBili  0.4  /  DBili  x   /  AST  127< from: CT Abdomen and Pelvis w/ IV Cont (07.05.20 @ 02:05) >    <H>  /  ALT  166<H>  /  AlkPhos  239<H>  07-08    IMPRESSION:    Multiple vague wedge-shaped opacities in enlarged spleen. Findings are nonspecific, likely represent splenic infarcts. Infectious/inflammatory or neoplastic process considered in the differential. Consider nonemergent correlation with MR for better characterization.    Wall thickening of distal sigmoid colonic loop extending to the rectum significant surrounding stranding. This may be on the basis of inadequate distention or represent proctocolitis in appropriate clinical setting. Consider nonemergent colonoscopy evaluation if there concern for underlying lesion. No evidence of small bowel obstruction.     Normal appendix.    < end of copied text >

## 2020-07-08 NOTE — CONSULT NOTE ADULT - SUBJECTIVE AND OBJECTIVE BOX
HPI:  40y M with PMHx significant for Uncontrolled DM II, presents today c/o abdominal pain. States, pain started 2 days ago, but has progressively worsened, is located in left side abdominal wall, non radiated, cramping like pain. Additionally endorses subjective fever, malaise, nausea w/o vomiting, took Advil but not improvement was noted.  Also, reports redness and itchiness at the tip of his penis. He has been diagnosed with DM II many years ago but is not on any treatment or follows up with any doctor   Denies any sick contact, recent travel, clots disorders or malignancy (05 Jul 2020 04:41)      PAST MEDICAL & SURGICAL HISTORY:  No Past Medical History  H/O left knee surgery  No Past Surgical History      MEDICATIONS  (STANDING):  cefTRIAXone   IVPB      cefTRIAXone   IVPB 1000 milliGRAM(s) IV Intermittent every 24 hours  clotrimazole 1% Cream 1 Application(s) Topical two times a day  dextrose 5%. 1000 milliLiter(s) (50 mL/Hr) IV Continuous <Continuous>  dextrose 50% Injectable 12.5 Gram(s) IV Push once  dextrose 50% Injectable 25 Gram(s) IV Push once  dextrose 50% Injectable 25 Gram(s) IV Push once  enoxaparin Injectable 40 milliGRAM(s) SubCutaneous every 12 hours  insulin glargine Injectable (LANTUS) 20 Unit(s) SubCutaneous at bedtime  insulin lispro (HumaLOG) corrective regimen sliding scale   SubCutaneous three times a day before meals  insulin lispro (HumaLOG) corrective regimen sliding scale   SubCutaneous at bedtime  insulin lispro Injectable (HumaLOG) 6 Unit(s) SubCutaneous three times a day with meals  nystatin Powder 1 Application(s) Topical every 8 hours  sodium chloride 0.9%. 1000 milliLiter(s) (100 mL/Hr) IV Continuous <Continuous>    MEDICATIONS  (PRN):  acetaminophen   Tablet .. 650 milliGRAM(s) Oral every 4 hours PRN Temp greater or equal to 38C (100.4F), Mild Pain (1 - 3)  dextrose 40% Gel 15 Gram(s) Oral once PRN Blood Glucose LESS THAN 70 milliGRAM(s)/deciliter  glucagon  Injectable 1 milliGRAM(s) IntraMuscular once PRN Glucose LESS THAN 70 milligrams/deciliter  ondansetron Injectable 4 milliGRAM(s) IV Push every 6 hours PRN Nausea      Allergies    No Known Allergies    Intolerances        FAMILY HISTORY:  No pertinent family history in first degree relatives      Review of Systems    Constitutional, Eyes, ENT, Cardiovascular, Respiratory, Gastrointestinal, Genitourinary, Musculoskeletal, Integumentary, Neurological, Psychiatric, Endocrine, Heme/Lymph and Allergic/Immunologic review of systems are otherwise negative except as noted in HPI.     Vital Signs Last 24 Hrs  T(C): 37.1 (08 Jul 2020 15:36), Max: 37.1 (08 Jul 2020 15:36)  T(F): 98.7 (08 Jul 2020 15:36), Max: 98.7 (08 Jul 2020 15:36)  HR: 87 (08 Jul 2020 15:36) (78 - 87)  BP: 117/82 (08 Jul 2020 15:36) (114/81 - 117/82)  BP(mean): --  RR: 19 (08 Jul 2020 15:36) (18 - 19)  SpO2: 94% (08 Jul 2020 15:36) (94% - 95%)    Physical Exam  Constitutional: well developed, well nourished, in no acute distress and thin.   Eyes: PERRL, EOMI, no conjunctival infection, anicteric.   ENT: pharynx is unremarkable, moist mucus membrane, no oral lesions.   Neck: supple without JVD, no thyromegaly or masses appreciated.   Pulmonary: clear to auscultation bilaterally, no dullness, no wheezing.   Cardiac: RRR, normal S1S2, no murmurs, rubs, gallops.   Vascular: no JVD, no calf tenderness, venous stasis changes, varices.   Abdomen: normoactive bowel sounds, soft and nontender, no hepatosplenomegaly or masses appreciated.   Lymphatic: no peripheral adenopathy appreciated.   Musculoskeletal: full range of motion and no deformities appreciated.   Skin: normal appearance, no rash, nodules, vesicles, ulcers, erythema.   Neurology: grossly intact.   Psychiatric: affect appropriate.       LABS:  CBC Full  -  ( 08 Jul 2020 11:53 )  WBC Count : 6.99 K/uL  RBC Count : 4.60 M/uL  Hemoglobin : 13.7 g/dL  Hematocrit : 41.6 %  Platelet Count - Automated : 418 K/uL  Mean Cell Volume : 90.4 fl  Mean Cell Hemoglobin : 29.8 pg  Mean Cell Hemoglobin Concentration : 32.9 gm/dL  Auto Neutrophil # : 3.08 K/uL  Auto Lymphocyte # : 3.07 K/uL  Auto Monocyte # : 0.53 K/uL  Auto Eosinophil # : 0.19 K/uL  Auto Basophil # : 0.05 K/uL  Auto Neutrophil % : 44.1 %  Auto Lymphocyte % : 43.9 %  Auto Monocyte % : 7.6 %  Auto Eosinophil % : 2.7 %  Auto Basophil % : 0.7 %    07-08    138  |  98  |  12.0  ----------------------------<  161<H>  4.1   |  26.0  |  0.75    Ca    9.2      08 Jul 2020 11:53  Phos  3.2     07-07  Mg     2.2     07-07    TPro  7.6  /  Alb  3.5  /  TBili  0.4  /  DBili  x   /  AST  127<H>  /  ALT  166<H>  /  AlkPhos  239<H>  07-08          RADIOLOGY & ADDITIONAL STUDIES:  < from: CT Abdomen and Pelvis w/ IV Cont (07.05.20 @ 02:05) >     EXAM:  CT ABDOMEN AND PELVIS IC                          PROCEDURE DATE:  07/05/2020          INTERPRETATION:  CT ABDOMEN AND PELVIS WITH CONTRAST    INDICATION: Left lower quadrant abdominal pain.    TECHNIQUE: Contrast enhanced CT of the abdomenand pelvis. Images are reformatted in the sagittal and coronal planes.    90 mL of Omnipaque 350 contrast material was injected IV.    COMPARISON: None.    FINDINGS:    Lower Thorax: No consolidation or effusion.    Liver: Enlarged fatty liver measuring 23 cm in length. Suggestion of fatty sparing at the gallbladder fossa.  Biliary: No significant dilatation. No calcified gallstones within the gallbladder.  Spleen: Enlarged measuring 16 cm in length. Multiple vague wedge-shaped opacities. Findings are nonspecific, likely represent splenic infarcts. Infectious/inflammatory or neoplastic process considered in the differential. Consider nonemergent correlation with MR for better characterization.  Pancreas: No inflammatory changes or ductal dilatation.  Adrenals: Normal.  Kidneys: Symmetrically enhancing without hydronephrosis.  Vessels: Normal caliber. Mild atherosclerotic disease of pelvic vessels.    GI tract: There is wall thickening of distal sigmoid colonic loop extending to the rectumsignificant surrounding stranding. This may be on the basis of inadequate distention or represent proctocolitis in appropriate clinical setting. Consider nonemergent colonoscopy evaluation this concern for underlying lesion. Mild diverticulosis without evidence of acute diverticulitis. No evidence of small bowel obstruction. Normal appendix.    Peritoneum/retroperitoneum and mesentery: No free air. No organized fluid collection. No adenopathy.    Pelvic organs/Bladder: No pelvic masses. Bladder is normal.    Abdominal wall: A small fat containing umbilical hernia is noted.  Bones and soft tissues: Mild multilevel degenerative changes of the spine are noted.    IMPRESSION:    Multiple vague wedge-shaped opacities in enlarged spleen. Findings are nonspecific, likely represent splenic infarcts. Infectious/inflammatory or neoplastic process considered in the differential. Consider nonemergent correlation with MR for better characterization.    Wall thickening of distal sigmoid colonic loop extending to the rectum significant surrounding stranding. This may be on the basis of inadequate distention or represent proctocolitis in appropriate clinical setting. Consider nonemergent colonoscopy evaluation if there concern for underlying lesion. No evidence of small bowel obstruction.     Normal appendix.      < end of copied text >      < from: US Duplex Venous Lower Ext Complete, Bilateral (07.06.20 @ 15:25) >     EXAM:  US DPLX LWR EXT VEINS COMPL BI                          PROCEDURE DATE:  07/06/2020          INTERPRETATION:  CLINICAL INFORMATION: Fever. Left leg pain.    COMPARISON: None available.    TECHNIQUE: Duplex sonography of the BILATERAL LOWER extremity veins with color and spectral Doppler, with and without compression.      FINDINGS:    There is normal compressibility of the bilateral common femoral, femoral and popliteal veins.   Doppler examination shows normal spontaneous and phasic flow.    No calf vein thrombosis is detected.    IMPRESSION:   No evidence of deep venous thrombosis in either lower extremity.    < end of copied text >

## 2020-07-08 NOTE — CONSULT NOTE ADULT - SUBJECTIVE AND OBJECTIVE BOX
Patient is a 40y old  Male who presents with a chief complaint of Abdominal pain (07 Jul 2020 11:57)    HPI:  40M with PMHx significant for Uncontrolled DM II, presents today c/o 2 day history of worsening left sided nonradiating, cramp like abdominal pain. Additionally endorses subjective fever, malaise, nausea w/o vomiting, took Advil but not improvement was noted. Also, reports redness and itchiness at the tip of his penis. He has been diagnosed with DM II many years ago but is not on any treatment or follows up with any doctor   Denies any sick contact, recent travel, clots disorders or malignancy       PAST MEDICAL & SURGICAL HISTORY:  No Past Medical History  H/O left knee surgery  No Past Surgical History      Social History:  Drinks socially CAGE 0/4, denies smoking or illicit drugs (05 Jul 2020 04:41)      FAMILY HISTORY:  No pertinent family history in first degree relatives        Allergies    No Known Allergies    Intolerances        REVIEW OF SYSTEMS:    CONSTITUTIONAL: No fever, weight loss, or fatigue  EYES: No eye pain, visual disturbances, or discharge  ENMT:  No difficulty hearing, tinnitus, vertigo; No sinus or throat pain  NECK: No pain or stiffness  RESPIRATORY: No cough, wheezing, chills or hemoptysis; No shortness of breath  CARDIOVASCULAR: No chest pain, palpitations, dizziness, or leg swelling  GASTROINTESTINAL: No abdominal or epigastric pain. No nausea, vomiting, or hematemesis; No diarrhea or constipation. No melena or hematochezia.  NEUROLOGICAL: No headaches, memory loss, loss of strength, numbness, or tremors  SKIN: No itching, burning, rashes, or lesions   MUSCULOSKELETAL: No joint pain or swelling; No muscle, back, or extremity pain  PSYCHIATRIC: No depression, anxiety, mood swings, or difficulty sleeping        MEDICATIONS  (STANDING):  cefTRIAXone   IVPB      cefTRIAXone   IVPB 1000 milliGRAM(s) IV Intermittent every 24 hours  clotrimazole 1% Cream 1 Application(s) Topical two times a day  dextrose 5%. 1000 milliLiter(s) (50 mL/Hr) IV Continuous <Continuous>  dextrose 50% Injectable 12.5 Gram(s) IV Push once  dextrose 50% Injectable 25 Gram(s) IV Push once  dextrose 50% Injectable 25 Gram(s) IV Push once  enoxaparin Injectable 40 milliGRAM(s) SubCutaneous every 12 hours  insulin glargine Injectable (LANTUS) 20 Unit(s) SubCutaneous at bedtime  insulin lispro (HumaLOG) corrective regimen sliding scale   SubCutaneous three times a day before meals  insulin lispro (HumaLOG) corrective regimen sliding scale   SubCutaneous at bedtime  insulin lispro Injectable (HumaLOG) 6 Unit(s) SubCutaneous three times a day with meals  nystatin Powder 1 Application(s) Topical every 8 hours  sodium chloride 0.9%. 1000 milliLiter(s) (100 mL/Hr) IV Continuous <Continuous>    MEDICATIONS  (PRN):  acetaminophen   Tablet .. 650 milliGRAM(s) Oral every 4 hours PRN Temp greater or equal to 38C (100.4F), Mild Pain (1 - 3)  dextrose 40% Gel 15 Gram(s) Oral once PRN Blood Glucose LESS THAN 70 milliGRAM(s)/deciliter  glucagon  Injectable 1 milliGRAM(s) IntraMuscular once PRN Glucose LESS THAN 70 milligrams/deciliter  ondansetron Injectable 4 milliGRAM(s) IV Push every 6 hours PRN Nausea      Vital Signs Last 24 Hrs  T(C): 36.9 (07 Jul 2020 21:16), Max: 37.3 (07 Jul 2020 15:59)  T(F): 98.5 (07 Jul 2020 21:16), Max: 99.2 (07 Jul 2020 15:59)  HR: 78 (07 Jul 2020 21:16) (78 - 86)  BP: 114/81 (07 Jul 2020 21:16) (114/75 - 114/81)  BP(mean): --  RR: 18 (07 Jul 2020 21:16) (18 - 18)  SpO2: 95% (07 Jul 2020 21:16) (95% - 96%)      PHYSICAL EXAM:    Constitutional: NAD, obese  HEENT: EOMI, no exophalmos  Neck: trachea midline, no thyroid enlargement  Respiratory: CTAB  Cardiovascular: S1 and S2, RRR  Gastrointestinal: BS+, soft, ntnd  Extremities: No peripheral edema  Neurological: A/O x 3, no focal deficits  Psychiatric: Normal mood, normal affect  Skin: No rashes, no acanthosis        LABS  07-07    135  |  95<L>  |  8.0  ----------------------------<  203<H>  4.1   |  27.0  |  0.75    Ca    8.9      07 Jul 2020 07:27  Phos  3.2     07-07  Mg     2.2     07-07    TPro  7.5  /  Alb  3.4  /  TBili  0.6  /  DBili  0.1  /  AST  50<H>  /  ALT  95<H>  /  AlkPhos  224<H>  07-07                          13.7   6.99  )-----------( 418      ( 08 Jul 2020 11:53 )             41.6       A1C with Estimated Average Glucose Result: 13.3 % (07-06-20 @ 04:49)          Alanine Aminotransferase (ALT/SGPT): 95 U/L (07-07-20 @ 07:27)  Alkaline Phosphatase, Serum: 224 U/L (07-07-20 @ 07:27)  Albumin, Serum: 3.4 g/dL (07-07-20 @ 07:27)  Aspartate Aminotransferase (AST/SGOT): 50 U/L (07-07-20 @ 07:27)      CAPILLARY BLOOD GLUCOSE      POCT Blood Glucose.: 152 mg/dL (08 Jul 2020 11:43)  POCT Blood Glucose.: 184 mg/dL (08 Jul 2020 08:31)  POCT Blood Glucose.: 237 mg/dL (07 Jul 2020 20:54)  POCT Blood Glucose.: 191 mg/dL (07 Jul 2020 16:57) Patient is a 40y old  Male who presents with a chief complaint of Abdominal pain (07 Jul 2020 11:57)    HPI:  40M with PMHx significant for Uncontrolled DM II, presents today c/o 2 day history of worsening left sided nonradiating, cramp like abdominal pain. Additionally endorses subjective fever, malaise, nausea w/o vomiting, took Advil but not improvement was noted. Also, reports redness and itchiness at the tip of his penis. He has been diagnosed with DM II many years ago but is not on any treatment or follows up with any doctor   Denies any sick contact, recent travel, clots disorders or malignancy   reports no meds at home  new to diabetes diagnosis  wife at bedside  no FH of diabetes    PAST MEDICAL & SURGICAL HISTORY:  No Past Medical History  H/O left knee surgery  No Past Surgical History      Social History:  Drinks socially CAGE 0/4, denies smoking or illicit drugs (05 Jul 2020 04:41)      FAMILY HISTORY:  No pertinent family history in first degree relatives        Allergies    No Known Allergies    Intolerances        REVIEW OF SYSTEMS:    CONSTITUTIONAL: No fever, weight loss, or fatigue  EYES: No eye pain, visual disturbances, or discharge  ENMT:  No difficulty hearing, tinnitus, vertigo; No sinus or throat pain  NECK: No pain or stiffness  RESPIRATORY: No cough, wheezing, chills or hemoptysis; No shortness of breath  CARDIOVASCULAR: No chest pain, palpitations, dizziness, or leg swelling  GASTROINTESTINAL: No abdominal or epigastric pain. No nausea, vomiting, or hematemesis; No diarrhea or constipation. No melena or hematochezia.  NEUROLOGICAL: No headaches, memory loss, loss of strength, numbness, or tremors  SKIN: No itching, burning, rashes, or lesions   MUSCULOSKELETAL: No joint pain or swelling; No muscle, back, or extremity pain  PSYCHIATRIC: No depression, anxiety, mood swings, or difficulty sleeping        MEDICATIONS  (STANDING):  cefTRIAXone   IVPB      cefTRIAXone   IVPB 1000 milliGRAM(s) IV Intermittent every 24 hours  clotrimazole 1% Cream 1 Application(s) Topical two times a day  dextrose 5%. 1000 milliLiter(s) (50 mL/Hr) IV Continuous <Continuous>  dextrose 50% Injectable 12.5 Gram(s) IV Push once  dextrose 50% Injectable 25 Gram(s) IV Push once  dextrose 50% Injectable 25 Gram(s) IV Push once  enoxaparin Injectable 40 milliGRAM(s) SubCutaneous every 12 hours  insulin glargine Injectable (LANTUS) 20 Unit(s) SubCutaneous at bedtime  insulin lispro (HumaLOG) corrective regimen sliding scale   SubCutaneous three times a day before meals  insulin lispro (HumaLOG) corrective regimen sliding scale   SubCutaneous at bedtime  insulin lispro Injectable (HumaLOG) 6 Unit(s) SubCutaneous three times a day with meals  nystatin Powder 1 Application(s) Topical every 8 hours  sodium chloride 0.9%. 1000 milliLiter(s) (100 mL/Hr) IV Continuous <Continuous>    MEDICATIONS  (PRN):  acetaminophen   Tablet .. 650 milliGRAM(s) Oral every 4 hours PRN Temp greater or equal to 38C (100.4F), Mild Pain (1 - 3)  dextrose 40% Gel 15 Gram(s) Oral once PRN Blood Glucose LESS THAN 70 milliGRAM(s)/deciliter  glucagon  Injectable 1 milliGRAM(s) IntraMuscular once PRN Glucose LESS THAN 70 milligrams/deciliter  ondansetron Injectable 4 milliGRAM(s) IV Push every 6 hours PRN Nausea      Vital Signs Last 24 Hrs  T(C): 36.9 (07 Jul 2020 21:16), Max: 37.3 (07 Jul 2020 15:59)  T(F): 98.5 (07 Jul 2020 21:16), Max: 99.2 (07 Jul 2020 15:59)  HR: 78 (07 Jul 2020 21:16) (78 - 86)  BP: 114/81 (07 Jul 2020 21:16) (114/75 - 114/81)  BP(mean): --  RR: 18 (07 Jul 2020 21:16) (18 - 18)  SpO2: 95% (07 Jul 2020 21:16) (95% - 96%)      PHYSICAL EXAM:    Constitutional: NAD, obese  HEENT: EOMI, no exophalmos  Neck: trachea midline, no thyroid enlargement  Respiratory: CTAB  Cardiovascular: S1 and S2, RRR  Gastrointestinal: BS+, soft, ntnd  Extremities: No peripheral edema  Neurological: A/O x 3, no focal deficits  Psychiatric: Normal mood, normal affect  Skin: No rashes, no acanthosis        LABS  07-07    135  |  95<L>  |  8.0  ----------------------------<  203<H>  4.1   |  27.0  |  0.75    Ca    8.9      07 Jul 2020 07:27  Phos  3.2     07-07  Mg     2.2     07-07    TPro  7.5  /  Alb  3.4  /  TBili  0.6  /  DBili  0.1  /  AST  50<H>  /  ALT  95<H>  /  AlkPhos  224<H>  07-07                          13.7   6.99  )-----------( 418      ( 08 Jul 2020 11:53 )             41.6       A1C with Estimated Average Glucose Result: 13.3 % (07-06-20 @ 04:49)          Alanine Aminotransferase (ALT/SGPT): 95 U/L (07-07-20 @ 07:27)  Alkaline Phosphatase, Serum: 224 U/L (07-07-20 @ 07:27)  Albumin, Serum: 3.4 g/dL (07-07-20 @ 07:27)  Aspartate Aminotransferase (AST/SGOT): 50 U/L (07-07-20 @ 07:27)      CAPILLARY BLOOD GLUCOSE      POCT Blood Glucose.: 152 mg/dL (08 Jul 2020 11:43)  POCT Blood Glucose.: 184 mg/dL (08 Jul 2020 08:31)  POCT Blood Glucose.: 237 mg/dL (07 Jul 2020 20:54)  POCT Blood Glucose.: 191 mg/dL (07 Jul 2020 16:57)

## 2020-07-08 NOTE — PROGRESS NOTE ADULT - SUBJECTIVE AND OBJECTIVE BOX
INTERVAL HPI/OVERNIGHT EVENTS:    CC:  SIRS resolved, uncontrolled diabetes mellitus, splenic infarct      No overnight events, denies complaints. Penile discharge since admission, has been on topical medications. Examined with male chaperon, no discharge or redness noted.       Vital Signs Last 24 Hrs  T(C): 36.9 (07 Jul 2020 21:16), Max: 37.3 (07 Jul 2020 15:59)  T(F): 98.5 (07 Jul 2020 21:16), Max: 99.2 (07 Jul 2020 15:59)  HR: 78 (07 Jul 2020 21:16) (78 - 86)  BP: 114/81 (07 Jul 2020 21:16) (114/75 - 114/81)  BP(mean): --  RR: 18 (07 Jul 2020 21:16) (18 - 18)  SpO2: 95% (07 Jul 2020 21:16) (95% - 96%)    PHYSICAL EXAM:    GENERAL: alert, not in distress  CHEST/LUNG: b/l air entry  HEART: regular  ABDOMEN: soft, non tender, bs+  EXTREMITIES:  no edema, tenderness    MEDICATIONS  (STANDING):  cefTRIAXone   IVPB      cefTRIAXone   IVPB 1000 milliGRAM(s) IV Intermittent every 24 hours  clotrimazole 1% Cream 1 Application(s) Topical two times a day  dextrose 5%. 1000 milliLiter(s) (50 mL/Hr) IV Continuous <Continuous>  dextrose 50% Injectable 12.5 Gram(s) IV Push once  dextrose 50% Injectable 25 Gram(s) IV Push once  dextrose 50% Injectable 25 Gram(s) IV Push once  enoxaparin Injectable 40 milliGRAM(s) SubCutaneous every 12 hours  insulin glargine Injectable (LANTUS) 20 Unit(s) SubCutaneous at bedtime  insulin lispro (HumaLOG) corrective regimen sliding scale   SubCutaneous three times a day before meals  insulin lispro (HumaLOG) corrective regimen sliding scale   SubCutaneous at bedtime  insulin lispro Injectable (HumaLOG) 6 Unit(s) SubCutaneous three times a day with meals  nystatin Powder 1 Application(s) Topical every 8 hours  sodium chloride 0.9%. 1000 milliLiter(s) (100 mL/Hr) IV Continuous <Continuous>    MEDICATIONS  (PRN):  acetaminophen   Tablet .. 650 milliGRAM(s) Oral every 4 hours PRN Temp greater or equal to 38C (100.4F), Mild Pain (1 - 3)  dextrose 40% Gel 15 Gram(s) Oral once PRN Blood Glucose LESS THAN 70 milliGRAM(s)/deciliter  glucagon  Injectable 1 milliGRAM(s) IntraMuscular once PRN Glucose LESS THAN 70 milligrams/deciliter  ondansetron Injectable 4 milliGRAM(s) IV Push every 6 hours PRN Nausea      Allergies    No Known Allergies    Intolerances          LABS:                          13.7   6.99  )-----------( 418      ( 08 Jul 2020 11:53 )             41.6     07-08    138  |  98  |  12.0  ----------------------------<  161<H>  4.1   |  26.0  |  0.75    Ca    9.2      08 Jul 2020 11:53  Phos  3.2     07-07  Mg     2.2     07-07    TPro  7.6  /  Alb  3.5  /  TBili  0.4  /  DBili  x   /  AST  127<H>  /  ALT  166<H>  /  AlkPhos  239<H>  07-08          RADIOLOGY & ADDITIONAL TESTS:

## 2020-07-08 NOTE — PROGRESS NOTE ADULT - SUBJECTIVE AND OBJECTIVE BOX
[facility]  INFECTIOUS DISEASES AND INTERNAL MEDICINE at [place]  =======================================================  Jose Luis Pedro MD  Diplomates American Board of Internal Medicine and Infectious Diseases  Tel: [phone]      Fax: [phone]  =======================================================    ROHINI SINGH [id number]    Seen with     Follow up: Fever    No fevers     No complaints today     Allergies:  No Known Allergies       REVIEW OF SYSTEMS:  CONSTITUTIONAL:  No Fever or chills  HEENT:  No diplopia or blurred vision.  No earache, sore throat or runny nose.  CARDIOVASCULAR:  No pressure, squeezing, strangling, tightness, heaviness or aching about the chest, neck, axilla or epigastrium.  RESPIRATORY:  No cough, shortness of breath  GASTROINTESTINAL:  No nausea, vomiting or diarrhea. + Left sided abdominal pain improved   GENITOURINARY:  No dysuria, frequency or urgency.   MUSCULOSKELETAL:  no joint aches, no muscle pain  SKIN:  No change in skin, hair or nails.  NEUROLOGIC:  No Headaches, seizures   PSYCHIATRIC:  No disorder of thought or mood.  ENDOCRINE:  No heat or cold intolerance  HEMATOLOGICAL:  No easy bruising or bleeding.       Physical Exam:  GEN: NAD, pleasant  HEENT: normocephalic and atraumatic. EOMI. PERRL.  Anicteric  NECK: Supple.   LUNGS: Clear to auscultation.  HEART: Regular rate and rhythm   ABDOMEN: Soft, and nondistended. No Tenderness, no rebound or guarding. Positive bowel sounds.    : No CVA tenderness  EXTREMITIES: Without any edema.  MSK: No joint swelling  NEUROLOGIC: No Focal Deficits  PSYCHIATRIC: Appropriate affect .  SKIN: No Rash        Vitals:  T(F): 98.5 (07 Jul 2020 21:16), Max: 99.2 (07 Jul 2020 15:59)  HR: 78 (07 Jul 2020 21:16)  BP: 114/81 (07 Jul 2020 21:16)  RR: 18 (07 Jul 2020 21:16)  SpO2: 95% (07 Jul 2020 21:16) (95% - 96%)  temp max in last 48H T(F): , Max: 99.2 (07-06-20 @ 16:25)      Current Antibiotics:  cefTRIAXone   IVPB      cefTRIAXone   IVPB 1000 milliGRAM(s) IV Intermittent every 24 hours    Other medications:  clotrimazole 1% Cream 1 Application(s) Topical two times a day  dextrose 5%. 1000 milliLiter(s) IV Continuous <Continuous>  dextrose 50% Injectable 12.5 Gram(s) IV Push once  dextrose 50% Injectable 25 Gram(s) IV Push once  dextrose 50% Injectable 25 Gram(s) IV Push once  enoxaparin Injectable 40 milliGRAM(s) SubCutaneous every 12 hours  insulin glargine Injectable (LANTUS) 20 Unit(s) SubCutaneous at bedtime  insulin lispro (HumaLOG) corrective regimen sliding scale   SubCutaneous three times a day before meals  insulin lispro (HumaLOG) corrective regimen sliding scale   SubCutaneous at bedtime  insulin lispro Injectable (HumaLOG) 6 Unit(s) SubCutaneous three times a day with meals  nystatin Powder 1 Application(s) Topical every 8 hours  sodium chloride 0.9%. 1000 milliLiter(s) IV Continuous <Continuous>        Labs:                        13.7   8.17  )-----------( 303      ( 07 Jul 2020 07:27 )             42.2      07-07    135  |  95<L>  |  8.0  ----------------------------<  203<H>  4.1   |  27.0  |  0.75    Ca    8.9      07 Jul 2020 07:27  Phos  3.2     07-07  Mg     2.2     07-07    TPro  7.5  /  Alb  3.4  /  TBili  0.6  /  DBili  0.1  /  AST  50<H>  /  ALT  95<H>  /  AlkPhos  224<H>  07-07      Culture - Urine (collected 07-05-20 @ 08:46)  Source: .Urine Clean Catch (Midstream)  Final Report (07-06-20 @ 07:19):    No growth    Culture - Blood (collected 07-05-20 @ 00:29)  Source: .Blood Blood-Peripheral    Culture - Blood (collected 07-05-20 @ 00:29)  Source: .Blood Blood-Peripheral    WBC Count: 8.17 K/uL (07-07-20 @ 07:27)  WBC Count: 9.63 K/uL (07-06-20 @ 04:49)  WBC Count: 8.31 K/uL (07-05-20 @ 00:27)    Creatinine, Serum: 0.75 mg/dL (07-07-20 @ 07:27)  Creatinine, Serum: 0.71 mg/dL (07-06-20 @ 04:49)  Creatinine, Serum: 0.76 mg/dL (07-05-20 @ 00:27)    C-Reactive Protein, Serum: 10.80 mg/dL (07-05-20 @ 06:46)    Ferritin, Serum: 1487 ng/mL (07-07-20 @ 07:27)  Ferritin, Serum: 1133 ng/mL (07-06-20 @ 04:49)  Ferritin, Serum: 931 ng/mL (07-05-20 @ 06:46)    Sedimentation Rate, Erythrocyte: 38 mm/hr (07-05-20 @ 06:46)    Procalcitonin, Serum: 0.75 ng/mL (07-07-20 @ 07:27)  Procalcitonin, Serum: 0.77 ng/mL (07-05-20 @ 06:46)    COVID-19 PCR: NotDetec (07-06-20 @ 11:15)  COVID-19 PCR: NotDetec (07-05-20 @ 05:31)          < from: US Duplex Venous Lower Ext Complete, Bilateral (07.06.20 @ 15:25) >  EXAM:  US DPLX LWR EXT VEINS COMPL BI                          PROCEDURE DATE:  07/06/2020      INTERPRETATION:  CLINICAL INFORMATION: Fever. Left leg pain.    COMPARISON: None available.    TECHNIQUE: Duplex sonography of the BILATERAL LOWER extremity veins with color and spectral Doppler, with and without compression.      FINDINGS:    There is normal compressibility of the bilateral common femoral, femoral and popliteal veins.   Doppler examination shows normal spontaneous and phasic flow.    No calf vein thrombosis is detected.    IMPRESSION:   No evidence of deep venous thrombosis in either lower extremity.    < end of copied text >      < from: CT Abdomen and Pelvis w/ IV Cont (07.05.20 @ 02:05) >   EXAM:  CT ABDOMEN AND PELVIS IC                          PROCEDURE DATE:  07/05/2020      INTERPRETATION:  CT ABDOMEN AND PELVIS WITH CONTRAST    INDICATION: Left lower quadrant abdominal pain.    TECHNIQUE: Contrast enhanced CT of the abdomenand pelvis. Images are reformatted in the sagittal and coronal planes.    90 mL of Omnipaque 350 contrast material was injected IV.    COMPARISON: None.    FINDINGS:    Lower Thorax: No consolidation or effusion.    Liver: Enlarged fatty liver measuring 23 cm in length. Suggestion of fatty sparing at the gallbladder fossa.  Biliary: No significant dilatation. No calcified gallstones within the gallbladder.  Spleen: Enlarged measuring 16 cm in length. Multiple vague wedge-shaped opacities. Findings are nonspecific, likely represent splenic infarcts. Infectious/inflammatory or neoplastic process considered in the differential. Consider nonemergent correlation with MR for better characterization.  Pancreas: No inflammatory changes or ductal dilatation.  Adrenals: Normal.  Kidneys: Symmetrically enhancing without hydronephrosis.  Vessels: Normal caliber. Mild atherosclerotic disease of pelvic vessels.    GI tract: There is wall thickening of distal sigmoid colonic loop extending to the rectumsignificant surrounding stranding. This may be on the basis of inadequate distention or represent proctocolitis in appropriate clinical setting. Consider nonemergent colonoscopy evaluation this concern for underlying lesion. Mild diverticulosis without evidence of acute diverticulitis. No evidence of small bowel obstruction. Normal appendix.    Peritoneum/retroperitoneum and mesentery: No free air. No organized fluid collection. No adenopathy.    Pelvic organs/Bladder: No pelvic masses. Bladder is normal.    Abdominal wall: A small fat containing umbilical hernia is noted.  Bones and soft tissues: Mild multilevel degenerative changes of the spine are noted.    IMPRESSION:    Multiple vague wedge-shaped opacities in enlarged spleen. Findings are nonspecific, likely represent splenic infarcts. Infectious/inflammatory or neoplastic process considered in the differential. Consider nonemergent correlation with MR for better characterization.    Wall thickening of distal sigmoid colonic loop extending to the rectum significant surrounding stranding. This may be on the basis of inadequate distention or represent proctocolitis in appropriate clinical setting. Consider nonemergent colonoscopy evaluation if there concern for underlying lesion. No evidence of small bowel obstruction.     Normal appendix.    < end of copied text >      < from: TTE Echo Complete w/ Contrast w/ Doppler (07.06.20 @ 17:57) >  Summary:   1. Normal left ventricular size and wall thicknesses, with normal systolic and diastolic function.   2. Left ventricular ejection fraction, by visual estimation, is 60 to 65%.   3. Normal right ventricular size and function.   4. The left atrium is normal in size.   5. The right atrium is normal in size.   6. Structurally normal mitral valve, with normal leaflet excursion.   7. Trace mitral valve regurgitation.   8. Normal trileaflet aortic valve with normal opening.   9. There is no evidence of pericardial effusion.    < end of copied text >

## 2020-07-08 NOTE — DISCHARGE NOTE NURSING/CASE MANAGEMENT/SOCIAL WORK - NSDCFUADDAPPT_GEN_ALL_CORE_FT
A DC FOLLOW UP APPOINTMENT HAS BEEN ARRANGED FOR YOU:  [facility]  MONDAY JULY 13 10:30AM W/ DR. DOVER  [facility]   [facility]  [phone]

## 2020-07-08 NOTE — CONSULT NOTE ADULT - PROBLEM SELECTOR RECOMMENDATION 9
Multiple vague wedge-shaped opacities in enlarged spleen. Findings are nonspecific, likely represent splenic infarcts. Infectious/inflammatory or neoplastic process considered in the differential. Consider nonemergent correlation with MR for better characterization.

## 2020-07-08 NOTE — CONSULT NOTE ADULT - PROBLEM SELECTOR RECOMMENDATION 9
-Etiology at present is unclear. No infectious sources identified, no obvious ongoing thrombotic phenomenon. D-dimer is elevated, but not specific for any particular disorder.  -Is it possible he had COVID despite negative PCR and Ab? Unlikely, but elevated ferritin in conjunction with a thrombotic event would fit profile of coronavirus infection.  -For completeness, check APLS antibody testing, FVL, prothrombin gene mutation, protein C/S, ATIII testing, which can be followed up as an outpatient.   -c/w lovenox ppx while inpatient  -We can consider MRI liver as an outpatient, but no plans for inpatient if it will delay his discharge.    -There is no role for anticoagulation without a confirmed hypercoagulable state.

## 2020-07-08 NOTE — CONSULT NOTE ADULT - ASSESSMENT
40y M with PMHx significant for Uncontrolled DM II, presents today c/o abdominal pain.  Multiple vague wedge-shaped opacities in enlarged spleen. Findings are nonspecific, likely represent splenic infarcts. Infectious/inflammatory or neoplastic process considered in the differential. Splenic abscess is an uncommon infection that typically results from endocarditis or another source of hematogenous seeding such as pneumonia, gastrointestinal perforation, or arteriovenous malformation.  Splenic infarction may have a similar presentation as splenic abscess and some of the same risk factors; in some cases, an abscess may form at the site of a previous infarct. Common presenting findings include pain, fever, nausea or vomiting, and splenomegaly; which he is symptomatic of.  Risk factors for splenic infarction include splenomegaly, septic emboli, thromboemboli, SCD or prothrombotic states such as APS, HIT or cancer.      Plan:   -MRI to evaluate spleen  -pain management for pain s/t splenic infarct  -hypercoagulable workup.  US neg for DVT however elevated d-dimer.  Workup to include repeat coags, Activated protein C, FVL, [place] 15330B gene mutation, protein C and S, Antithrombin III, aPL, anticardiolipin Ab, b2gp, ADA, lupus anticoagulant, MTHFR gene and homocysteine.     -non emergent colonoscopy recommended for wall thickening of distal sigmoid colonic loop extending to the rectum with significant surrounding stranding to r/o underlying lesion.

## 2020-07-08 NOTE — CONSULT NOTE ADULT - ASSESSMENT
40M with PMHx significant for Uncontrolled DM II, presents today c/o 2 day history of worsening left sided nonradiating, cramp like abdominal pain. Additionally endorses subjective fever, malaise, nausea w/o vomiting, took Advil but not improvement was noted. Also, reports redness and itchiness at the tip of his penis. He has been diagnosed with DM II many years ago but is not on any treatment or follows up with any doctor. Denies any sick contact, recent travel, clots disorders or malignancy. Found on CT scan to have multiple splenic infarcts with proctocolitis    Uncontrolled T2DM- hyperglycemic  -A1c 13.3  -check sugars AC and bedtime  -ensure diabetic diet  -increase lantus to 24 units QHS  -agree with lispro 6 units TID  -will need endocrine follow up  -expect that will need basal insulin, can probably discharge on metformin 500mg BID with meals and januvia 100mg and lantus 20 units  -continue with insulin sliding scale    Proctocolitis- followed by ID. on abx    HLD- continue statin 40M with PMHx significant for Uncontrolled DM II, presents today c/o 2 day history of worsening left sided nonradiating, cramp like abdominal pain. Additionally endorses subjective fever, malaise, nausea w/o vomiting, took Advil but not improvement was noted. Also, reports redness and itchiness at the tip of his penis. He has been diagnosed with DM II many years ago but is not on any treatment or follows up with any doctor. Denies any sick contact, recent travel, clots disorders or malignancy. 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 on abx    HLD- continue statin

## 2020-07-08 NOTE — DIETITIAN INITIAL EVALUATION ADULT. - PERTINENT LABORATORY DATA
07-07 Na135 mmol/L Glu 203 mg/dL<H> K+ 4.1 mmol/L Cr  0.75 mg/dL BUN 8.0 mg/dL Phos 3.2 mg/dL Alb 3.4 g/dL PAB n/a

## 2020-07-08 NOTE — DIETITIAN INITIAL EVALUATION ADULT. - OTHER INFO
40 yr old male with uncontrolled diabetes mellitus presented with 2 days of worsening left sided abdominal pain, fever and chills. Labs on admission revealed elevated blood sugars and LFTs. CT abdomen was done, revealed splenic infarcts. Given concern for sepsis, cultures were sent and empiric antibiotics were given. COVID 19 was negative. He continued to be febrile, ID was consulted and repeat COVID 19 was sent. His A1C was 13, insulin dose was adjusted. Provided English diet education to pt. Meal planning with plate model in English given. Reviewed guidelines. Pt understands. Pt is obese with BMI 39.2. A1C 13.3 noted. Encouraged pt to follow up with endocrinologist. Pt eating well.

## 2020-07-09 ENCOUNTER — TRANSCRIPTION ENCOUNTER (OUTPATIENT)
Age: 40
End: 2020-07-09

## 2020-07-09 VITALS — RESPIRATION RATE: 19 BRPM | HEART RATE: 82 BPM | SYSTOLIC BLOOD PRESSURE: 117 MMHG | TEMPERATURE: 99 F

## 2020-07-09 LAB
ALBUMIN SERPL ELPH-MCNC: 3.5 G/DL — SIGNIFICANT CHANGE UP (ref 3.3–5.2)
ALP SERPL-CCNC: 249 U/L — HIGH (ref 40–120)
ALT FLD-CCNC: 164 U/L — HIGH
ANION GAP SERPL CALC-SCNC: 17 MMOL/L — SIGNIFICANT CHANGE UP (ref 5–17)
AST SERPL-CCNC: 102 U/L — HIGH
BILIRUB SERPL-MCNC: 0.4 MG/DL — SIGNIFICANT CHANGE UP (ref 0.4–2)
BUN SERPL-MCNC: 14 MG/DL — SIGNIFICANT CHANGE UP (ref 8–20)
CALCIUM SERPL-MCNC: 9.4 MG/DL — SIGNIFICANT CHANGE UP (ref 8.6–10.2)
CHLORIDE SERPL-SCNC: 98 MMOL/L — SIGNIFICANT CHANGE UP (ref 98–107)
CO2 SERPL-SCNC: 21 MMOL/L — LOW (ref 22–29)
CREAT SERPL-MCNC: 0.72 MG/DL — SIGNIFICANT CHANGE UP (ref 0.5–1.3)
GLUCOSE BLDC GLUCOMTR-MCNC: 133 MG/DL — HIGH (ref 70–99)
GLUCOSE BLDC GLUCOMTR-MCNC: 180 MG/DL — HIGH (ref 70–99)
GLUCOSE SERPL-MCNC: 192 MG/DL — HIGH (ref 70–99)
POTASSIUM SERPL-MCNC: 4.3 MMOL/L — SIGNIFICANT CHANGE UP (ref 3.5–5.3)
POTASSIUM SERPL-SCNC: 4.3 MMOL/L — SIGNIFICANT CHANGE UP (ref 3.5–5.3)
PROT SERPL-MCNC: 7.9 G/DL — SIGNIFICANT CHANGE UP (ref 6.6–8.7)
SODIUM SERPL-SCNC: 136 MMOL/L — SIGNIFICANT CHANGE UP (ref 135–145)

## 2020-07-09 PROCEDURE — 83605 ASSAY OF LACTIC ACID: CPT

## 2020-07-09 PROCEDURE — 85652 RBC SED RATE AUTOMATED: CPT

## 2020-07-09 PROCEDURE — 87491 CHLMYD TRACH DNA AMP PROBE: CPT

## 2020-07-09 PROCEDURE — 86901 BLOOD TYPING SEROLOGIC RH(D): CPT

## 2020-07-09 PROCEDURE — 87086 URINE CULTURE/COLONY COUNT: CPT

## 2020-07-09 PROCEDURE — 82803 BLOOD GASES ANY COMBINATION: CPT

## 2020-07-09 PROCEDURE — 99285 EMERGENCY DEPT VISIT HI MDM: CPT | Mod: 25

## 2020-07-09 PROCEDURE — 84145 PROCALCITONIN (PCT): CPT

## 2020-07-09 PROCEDURE — 80076 HEPATIC FUNCTION PANEL: CPT

## 2020-07-09 PROCEDURE — 86308 HETEROPHILE ANTIBODY SCREEN: CPT

## 2020-07-09 PROCEDURE — 80053 COMPREHEN METABOLIC PANEL: CPT

## 2020-07-09 PROCEDURE — 86850 RBC ANTIBODY SCREEN: CPT

## 2020-07-09 PROCEDURE — 80048 BASIC METABOLIC PNL TOTAL CA: CPT

## 2020-07-09 PROCEDURE — 85303 CLOT INHIBIT PROT C ACTIVITY: CPT

## 2020-07-09 PROCEDURE — 86665 EPSTEIN-BARR CAPSID VCA: CPT

## 2020-07-09 PROCEDURE — U0003: CPT

## 2020-07-09 PROCEDURE — 87040 BLOOD CULTURE FOR BACTERIA: CPT

## 2020-07-09 PROCEDURE — 82728 ASSAY OF FERRITIN: CPT

## 2020-07-09 PROCEDURE — 86663 EPSTEIN-BARR ANTIBODY: CPT

## 2020-07-09 PROCEDURE — 83036 HEMOGLOBIN GLYCOSYLATED A1C: CPT

## 2020-07-09 PROCEDURE — 82009 KETONE BODYS QUAL: CPT

## 2020-07-09 PROCEDURE — 83615 LACTATE (LD) (LDH) ENZYME: CPT

## 2020-07-09 PROCEDURE — 99239 HOSP IP/OBS DSCHRG MGMT >30: CPT

## 2020-07-09 PROCEDURE — 85379 FIBRIN DEGRADATION QUANT: CPT

## 2020-07-09 PROCEDURE — 81001 URINALYSIS AUTO W/SCOPE: CPT

## 2020-07-09 PROCEDURE — 80074 ACUTE HEPATITIS PANEL: CPT

## 2020-07-09 PROCEDURE — 86664 EPSTEIN-BARR NUCLEAR ANTIGEN: CPT

## 2020-07-09 PROCEDURE — 82962 GLUCOSE BLOOD TEST: CPT

## 2020-07-09 PROCEDURE — 85027 COMPLETE CBC AUTOMATED: CPT

## 2020-07-09 PROCEDURE — 86900 BLOOD TYPING SEROLOGIC ABO: CPT

## 2020-07-09 PROCEDURE — 86140 C-REACTIVE PROTEIN: CPT

## 2020-07-09 PROCEDURE — 86703 HIV-1/HIV-2 1 RESULT ANTBDY: CPT

## 2020-07-09 PROCEDURE — 85730 THROMBOPLASTIN TIME PARTIAL: CPT

## 2020-07-09 PROCEDURE — 83735 ASSAY OF MAGNESIUM: CPT

## 2020-07-09 PROCEDURE — C8929: CPT

## 2020-07-09 PROCEDURE — 93005 ELECTROCARDIOGRAM TRACING: CPT

## 2020-07-09 PROCEDURE — 86769 SARS-COV-2 COVID-19 ANTIBODY: CPT

## 2020-07-09 PROCEDURE — 93970 EXTREMITY STUDY: CPT

## 2020-07-09 PROCEDURE — 85610 PROTHROMBIN TIME: CPT

## 2020-07-09 PROCEDURE — 96375 TX/PRO/DX INJ NEW DRUG ADDON: CPT

## 2020-07-09 PROCEDURE — 99232 SBSQ HOSP IP/OBS MODERATE 35: CPT

## 2020-07-09 PROCEDURE — 74177 CT ABD & PELVIS W/CONTRAST: CPT

## 2020-07-09 PROCEDURE — 36415 COLL VENOUS BLD VENIPUNCTURE: CPT

## 2020-07-09 PROCEDURE — 87799 DETECT AGENT NOS DNA QUANT: CPT

## 2020-07-09 PROCEDURE — 96374 THER/PROPH/DIAG INJ IV PUSH: CPT | Mod: XU

## 2020-07-09 PROCEDURE — 71045 X-RAY EXAM CHEST 1 VIEW: CPT

## 2020-07-09 PROCEDURE — 85306 CLOT INHIBIT PROT S FREE: CPT

## 2020-07-09 PROCEDURE — 85300 ANTITHROMBIN III ACTIVITY: CPT

## 2020-07-09 PROCEDURE — 84100 ASSAY OF PHOSPHORUS: CPT

## 2020-07-09 PROCEDURE — T1013: CPT

## 2020-07-09 PROCEDURE — 87591 N.GONORRHOEAE DNA AMP PROB: CPT

## 2020-07-09 PROCEDURE — 83690 ASSAY OF LIPASE: CPT

## 2020-07-09 RX ORDER — NYSTATIN CREAM 100000 [USP'U]/G
1 CREAM TOPICAL
Qty: 1 | Refills: 0
Start: 2020-07-09 | End: 2020-07-12

## 2020-07-09 RX ORDER — SITAGLIPTIN 50 MG/1
1 TABLET, FILM COATED ORAL
Qty: 30 | Refills: 0
Start: 2020-07-09 | End: 2020-08-07

## 2020-07-09 RX ORDER — METFORMIN HYDROCHLORIDE 850 MG/1
1 TABLET ORAL
Qty: 60 | Refills: 0
Start: 2020-07-09 | End: 2020-08-07

## 2020-07-09 RX ORDER — ENOXAPARIN SODIUM 100 MG/ML
20 INJECTION SUBCUTANEOUS
Qty: 2 | Refills: 0
Start: 2020-07-09 | End: 2020-08-07

## 2020-07-09 RX ADMIN — Medication 1: at 07:44

## 2020-07-09 RX ADMIN — NYSTATIN CREAM 1 APPLICATION(S): 100000 CREAM TOPICAL at 05:06

## 2020-07-09 RX ADMIN — Medication 6 UNIT(S): at 07:44

## 2020-07-09 RX ADMIN — Medication 6 UNIT(S): at 12:40

## 2020-07-09 RX ADMIN — ENOXAPARIN SODIUM 40 MILLIGRAM(S): 100 INJECTION SUBCUTANEOUS at 05:03

## 2020-07-09 RX ADMIN — Medication 1 APPLICATION(S): at 05:03

## 2020-07-09 RX ADMIN — CEFTRIAXONE 100 MILLIGRAM(S): 500 INJECTION, POWDER, FOR SOLUTION INTRAMUSCULAR; INTRAVENOUS at 05:02

## 2020-07-09 RX ADMIN — NYSTATIN CREAM 1 APPLICATION(S): 100000 CREAM TOPICAL at 12:45

## 2020-07-09 NOTE — DISCHARGE NOTE PROVIDER - HOSPITAL COURSE
40 yr old male with uncontrolled diabetes mellitus presented with 2 days of worsening left sided abdominal pain, fever and chills. Labs on admission revealed elevated blood sugars and LFTs. CT abdomen was done, revealed splenic infarcts. Given concern for sepsis, cultures were sent and empiric antibiotics were given. COVID 19 was negative. He continued to be febrile, ID was consulted and repeat COVID 19 was sent. His A1C was 13, insulin dose was adjusted. Repeat COVID 19 was negative. Ultrasound of LE was negative DVT. ECHO was done to rule out vegetations. His blood and urine cultures were negative. ECHO negative for vegetations. Endocrine and hematology consultations were requested. Hematology advised outpatient close follow up. He was educated to self administer insulin. He improved clinically and is stable for discharge home.        Spent > 35 mins in discharge plan and documentation.

## 2020-07-09 NOTE — PROGRESS NOTE ADULT - PROBLEM SELECTOR PLAN 2
Continue Lantus 15 units, monitor fingersticks, sliding scale coverage.
Continue Lantus 20 units and pre meal insulin 6 units. Monitor fingersticks, will need insulin on discharge, patient education for insulin administration. A1C 13, explained to patient.
Continue Lantus 24 units and pre meal insulin 6 units. Monitor fingersticks, sliding scale coverage. Endocrine evaluation noted.
Endocrine evaluation appreciated, continue Lantus 20 units, Metformin and Januvia on discharge. Monitor fingersticks.
Continue Lantus 15 units, monitor fingersticks, sliding scale coverage.

## 2020-07-09 NOTE — PROGRESS NOTE ADULT - PROBLEM SELECTOR PLAN 1
On Ceftriaxone, await cultures. ID consultation requested.
Continue antibiotics, await cultures.
Continue Ceftriaxone, afebrile.
Discontinue antibiotics, advised close follow up with GI on discharge.
On Ceftriaxone, ID following. No more fevers, cultures negative.

## 2020-07-09 NOTE — PROGRESS NOTE ADULT - PROVIDER SPECIALTY LIST ADULT
Hospitalist
Infectious Disease
Hospitalist
Hospitalist

## 2020-07-09 NOTE — PROGRESS NOTE ADULT - ATTENDING COMMENTS
Discussed with patient and RN plan of care.
Discussed with patient and RN.
Discussed with patient and RN plan of care.
Discussed with patient plan of care.
Plan of care and need for close follow up discussed with patient at length.   Stable for discharge home.

## 2020-07-09 NOTE — DISCHARGE NOTE PROVIDER - CARE PROVIDER_API CALL
Buck Aden  INTERNAL MEDICINE  [address] [facility] [address] [place]  [place], [place] [address]  Phone: [phone]  Fax: [phone]  Follow Up Time: 1 week    Rivas Tovar  GASTROENTEROLOGY  [address] [place] [address]  Phone: [phone]  Fax: [phone]  Follow Up Time:     Senait Wilkins  INTERNAL MEDICINE  [facility] [place] [address]  Phone: [phone]  Fax: [phone]  Follow Up Time:

## 2020-07-09 NOTE — PROGRESS NOTE ADULT - ASSESSMENT
40 yr old male with uncontrolled diabetes mellitus presented with 2 days of worsening left sided abdominal pain, fever and chills. Labs on admission revealed elevated blood sugars and LFTs. CT abdomen was done, revealed splenic infarcts. Given concern for sepsis, cultures were sent and empiric antibiotics were given.
40 yr old male with uncontrolled diabetes mellitus presented with 2 days of worsening left sided abdominal pain, fever and chills. Labs on admission revealed elevated blood sugars and LFTs. CT abdomen was done, revealed splenic infarcts. Given concern for sepsis, cultures were sent and empiric antibiotics were given. COVID 19 was negative. He continued to be febrile, ID was consulted and repeat COVID 19 was sent. His A1C was 13, insulin dose was adjusted. Repeat COVID 19 was negative. Ultrasound of LE was negative DVT. ECHO was done to rule out vegetations.
40 yr old male with uncontrolled diabetes mellitus presented with 2 days of worsening left sided abdominal pain, fever and chills. Labs on admission revealed elevated blood sugars and LFTs. CT abdomen was done, revealed splenic infarcts. Given concern for sepsis, cultures were sent and empiric antibiotics were given. COVID 19 was negative. He continued to be febrile, ID was consulted and repeat COVID 19 was sent. His A1C was 13, insulin dose was adjusted. Repeat COVID 19 was negative. Ultrasound of LE was negative DVT. ECHO was done to rule out vegetations. His blood and urine cultures were negative. ECHO negative for vegetations. Endocrine and hematology consultations were requested.
40 yr old male with uncontrolled diabetes mellitus presented with 2 days of worsening left sided abdominal pain, fever and chills. Labs on admission revealed elevated blood sugars and LFTs. CT abdomen was done, revealed splenic infarcts. Given concern for sepsis, cultures were sent and empiric antibiotics were given. COVID 19 was negative. He continued to be febrile, ID was consulted and repeat COVID 19 was sent. His A1C was 13, insulin dose was adjusted. Repeat COVID 19 was negative. Ultrasound of LE was negative DVT. ECHO was done to rule out vegetations. His blood and urine cultures were negative. ECHO negative for vegetations. Endocrine and hematology consultations were requested. Hematology advised outpatient close follow up. He was educated to self administer insulin.
40y  Male with h/o Uncontrolled DM II. Patient presented to the ER 7/5 with c/o abdominal pain associated with fevers which started 7/3 night. Symptoms progressively worsened. The Abdominal pain is located in left side abdominal wall, non radiating, cramping like pain. Patient took Advil with no improvement in symptoms. He has been diagnosed with DM II many years ago but is not on any treatment or follows up with any doctor. Denies any sick contact, recent travel, clots disorders or malignancy. Iatient remains febrile, to 104F on 7/6. No leukocytosis on admission. Started on Ceftriaxone.       Fever  Abdominal pain  Splenic infarcts   Transaminitis   Balanitis   Uncontrolled DM II      - Blood cultures no growth   - Repeat blood cultures if febrile   - COVID 19 PCR negative x 2  - CT A/P with splenic infarcts  - CXR reporting no pneumonia   - UA with no pyuria   - Procalcitonin level 0.77  - TTE reporting no acute findings  - US venus duplex negative for DVT  - Check HIV   - Check Viral hepatitis  - Start Nystatin for Balanitis   - Will need further eval for splenic infarcts   - Continue Ceftriaxone   - Trend Fever  - Trend Leukocytosis      Will Follow
40y  Male with h/o Uncontrolled DM II. Patient presented to the ER 7/5 with c/o abdominal pain associated with fevers which started 7/3 night. Symptoms progressively worsened. The Abdominal pain is located in left side abdominal wall, non radiating, cramping like pain. Patient took Advil with no improvement in symptoms. He has been diagnosed with DM II many years ago but is not on any treatment or follows up with any doctor. Denies any sick contact, recent travel, clots disorders or malignancy. Iatient remains febrile, to 104F on 7/6. No leukocytosis on admission. Started on Ceftriaxone.       Fever  Abdominal pain  Splenic infarcts   Transaminitis   Balanitis   Uncontrolled DM II      - Blood cultures no growth   - Repeat blood cultures if febrile   - COVID 19 PCR negative x 2  - CT A/P with splenic infarcts  - CXR reporting no pneumonia   - UA with no pyuria   - Procalcitonin level 0.77  - TTE reporting no acute findings  - US venus duplex negative for DVT  - HIV negative  - Viral hepatitis negative  - Nystatin for Balanitis   - Will need further eval for splenic infarcts - hem/onc eval noted   - D/C Ceftriaxone   - Trend Fever  - Trend Leukocytosis      Will sign off. Please call PRN.
40y  Male with h/o Uncontrolled DM II. Patient presented to the ER 7/5 with c/o abdominal pain associated with fevers which started 7/3 night. Symptoms progressively worsened. The Abdominal pain is located in left side abdominal wall, non radiating, cramping like pain. Patient took Advil with no improvement in symptoms. He has been diagnosed with DM II many years ago but is not on any treatment or follows up with any doctor. Denies any sick contact, recent travel, clots disorders or malignancy. Iatient remains febrile, to 104F on 7/6. No leukocytosis on admission. Started on Ceftriaxone.       Fever  Abdominal pain  Splenic infarcts   Uncontrolled DM II        - Blood cultures pending  - Repeat blood cultures if febrile   - COVID 19 PCR negative x 2  - CT A/P with splenic infarcts  - CXR reporting no pneumonia   - UA with no pyuria   - Procalcitonin level 0.77  - Will check TTE  - US venus duplex negative for DVT  - Will need further eval for splenic infarcts   - Continue Ceftriaxone   - Trend Fever  - Trend Leukocytosis      Will Follow
40 yr old male with uncontrolled diabetes mellitus presented with 2 days of worsening left sided abdominal pain, fever and chills. Labs on admission revealed elevated blood sugars and LFTs. CT abdomen was done, revealed splenic infarcts. Given concern for sepsis, cultures were sent and empiric antibiotics were given. COVID 19 was negative. He continued to be febrile, ID was consulted and repeat COVID 19 was sent. His A1C was 13, insulin dose was adjusted.

## 2020-07-09 NOTE — DISCHARGE NOTE PROVIDER - NSDCFUADDINST_GEN_ALL_CORE_FT
Continue medications as instructed, follow up with [facility] clinic on Monday. Follow up with hematology and GI for MRI abdomen and colonoscopy respectively. Monitor fingersticks. Return to ED for worsening complaints.

## 2020-07-09 NOTE — PROGRESS NOTE ADULT - SUBJECTIVE AND OBJECTIVE BOX
INTERVAL HPI/OVERNIGHT EVENTS:    CC: :  SIRS resolved, uncontrolled diabetes mellitus, splenic infarct      No overnight events, feels better. Tolerating diet, no fever, no abdominal pain.     Vital Signs Last 24 Hrs  T(C): 36.8 (09 Jul 2020 07:20), Max: 37.1 (08 Jul 2020 15:36)  T(F): 98.2 (09 Jul 2020 07:20), Max: 98.7 (08 Jul 2020 15:36)  HR: 85 (09 Jul 2020 07:20) (80 - 87)  BP: 115/79 (09 Jul 2020 07:20) (115/74 - 117/82)  BP(mean): --  RR: 19 (09 Jul 2020 07:20) (19 - 19)  SpO2: 96% (09 Jul 2020 07:20) (94% - 96%)    PHYSICAL EXAM:    GENERAL: obese, alert, not in distress  CHEST/LUNG: b/l air entry  HEART: regular  ABDOMEN: soft, bs+, non tender  EXTREMITIES:  no edema, tenderness    MEDICATIONS  (STANDING):  cefTRIAXone   IVPB      cefTRIAXone   IVPB 1000 milliGRAM(s) IV Intermittent every 24 hours  clotrimazole 1% Cream 1 Application(s) Topical two times a day  dextrose 5%. 1000 milliLiter(s) (50 mL/Hr) IV Continuous <Continuous>  dextrose 50% Injectable 12.5 Gram(s) IV Push once  dextrose 50% Injectable 25 Gram(s) IV Push once  dextrose 50% Injectable 25 Gram(s) IV Push once  enoxaparin Injectable 40 milliGRAM(s) SubCutaneous every 12 hours  insulin glargine Injectable (LANTUS) 20 Unit(s) SubCutaneous at bedtime  insulin lispro (HumaLOG) corrective regimen sliding scale   SubCutaneous three times a day before meals  insulin lispro (HumaLOG) corrective regimen sliding scale   SubCutaneous at bedtime  insulin lispro Injectable (HumaLOG) 6 Unit(s) SubCutaneous three times a day with meals  nystatin Powder 1 Application(s) Topical every 8 hours  sodium chloride 0.9%. 1000 milliLiter(s) (100 mL/Hr) IV Continuous <Continuous>    MEDICATIONS  (PRN):  acetaminophen   Tablet .. 650 milliGRAM(s) Oral every 4 hours PRN Temp greater or equal to 38C (100.4F), Mild Pain (1 - 3)  dextrose 40% Gel 15 Gram(s) Oral once PRN Blood Glucose LESS THAN 70 milliGRAM(s)/deciliter  glucagon  Injectable 1 milliGRAM(s) IntraMuscular once PRN Glucose LESS THAN 70 milligrams/deciliter  ondansetron Injectable 4 milliGRAM(s) IV Push every 6 hours PRN Nausea      Allergies    No Known Allergies    Intolerances          LABS:                          13.7   6.99  )-----------( 418      ( 08 Jul 2020 11:53 )             41.6     07-09    136  |  98  |  14.0  ----------------------------<  192<H>  4.3   |  21.0<L>  |  0.72    Ca    9.4      09 Jul 2020 12:02    TPro  7.9  /  Alb  3.5  /  TBili  0.4  /  DBili  x   /  AST  102<H>  /  ALT  164<H>  /  AlkPhos  249<H>  07-09          RADIOLOGY & ADDITIONAL TESTS:

## 2020-07-09 NOTE — DISCHARGE NOTE PROVIDER - NSDCCPCAREPLAN_GEN_ALL_CORE_FT
PRINCIPAL DISCHARGE DIAGNOSIS  Diagnosis: Proctocolitis  Assessment and Plan of Treatment:       SECONDARY DISCHARGE DIAGNOSES  Diagnosis: Elevated LFTs  Assessment and Plan of Treatment:     Diagnosis: Splenic infarct  Assessment and Plan of Treatment:

## 2020-07-09 NOTE — PROGRESS NOTE ADULT - SUBJECTIVE AND OBJECTIVE BOX
[facility]  INFECTIOUS DISEASES AND INTERNAL MEDICINE at [place]  =======================================================  Jose Luis Pedro MD  Diplomates American Board of Internal Medicine and Infectious Diseases  Tel: [phone]      Fax: [phone]  =======================================================    ROHINI SINGH [id number]    Seen with     Follow up: Fever    No fevers     No complaints today     Allergies:  No Known Allergies       REVIEW OF SYSTEMS:  CONSTITUTIONAL:  No Fever or chills  HEENT:  No diplopia or blurred vision.  No earache, sore throat or runny nose.  CARDIOVASCULAR:  No pressure, squeezing, strangling, tightness, heaviness or aching about the chest, neck, axilla or epigastrium.  RESPIRATORY:  No cough, shortness of breath  GASTROINTESTINAL:  No nausea, vomiting or diarrhea. + Left sided abdominal pain improved   GENITOURINARY:  No dysuria, frequency or urgency.   MUSCULOSKELETAL:  no joint aches, no muscle pain  SKIN:  No change in skin, hair or nails.  NEUROLOGIC:  No Headaches, seizures   PSYCHIATRIC:  No disorder of thought or mood.  ENDOCRINE:  No heat or cold intolerance  HEMATOLOGICAL:  No easy bruising or bleeding.       Physical Exam:  GEN: NAD, pleasant  HEENT: normocephalic and atraumatic. EOMI. PERRL.  Anicteric  NECK: Supple.   LUNGS: Clear to auscultation.  HEART: Regular rate and rhythm   ABDOMEN: Soft, and nondistended. No Tenderness, no rebound or guarding. Positive bowel sounds.    : No CVA tenderness  EXTREMITIES: Without any edema.  MSK: No joint swelling  NEUROLOGIC: No Focal Deficits  PSYCHIATRIC: Appropriate affect .  SKIN: No Rash      Vitals:    T(F): 98.2 (09 Jul 2020 07:20), Max: 98.7 (08 Jul 2020 15:36)  HR: 85 (09 Jul 2020 07:20)  BP: 115/79 (09 Jul 2020 07:20)  RR: 19 (09 Jul 2020 07:20)  SpO2: 96% (09 Jul 2020 07:20) (94% - 96%)  temp max in last 48H T(F): , Max: 99.2 (07-07-20 @ 15:59)      Current Antibiotics:  cefTRIAXone   IVPB      cefTRIAXone   IVPB 1000 milliGRAM(s) IV Intermittent every 24 hours    Other medications:  clotrimazole 1% Cream 1 Application(s) Topical two times a day  dextrose 5%. 1000 milliLiter(s) IV Continuous <Continuous>  dextrose 50% Injectable 12.5 Gram(s) IV Push once  dextrose 50% Injectable 25 Gram(s) IV Push once  dextrose 50% Injectable 25 Gram(s) IV Push once  enoxaparin Injectable 40 milliGRAM(s) SubCutaneous every 12 hours  insulin glargine Injectable (LANTUS) 20 Unit(s) SubCutaneous at bedtime  insulin lispro (HumaLOG) corrective regimen sliding scale   SubCutaneous three times a day before meals  insulin lispro (HumaLOG) corrective regimen sliding scale   SubCutaneous at bedtime  insulin lispro Injectable (HumaLOG) 6 Unit(s) SubCutaneous three times a day with meals  nystatin Powder 1 Application(s) Topical every 8 hours  sodium chloride 0.9%. 1000 milliLiter(s) IV Continuous <Continuous>        Labs:                        13.7   6.99  )-----------( 418      ( 08 Jul 2020 11:53 )             41.6      07-08    138  |  98  |  12.0  ----------------------------<  161<H>  4.1   |  26.0  |  0.75    Ca    9.2      08 Jul 2020 11:53    TPro  7.6  /  Alb  3.5  /  TBili  0.4  /  DBili  x   /  AST  127<H>  /  ALT  166<H>  /  AlkPhos  239<H>  07-08      Culture - Blood (collected 07-06-20 @ 14:13)  Source: .Blood Blood    Culture - Blood (collected 07-06-20 @ 14:12)  Source: .Blood Blood    Culture - Urine (collected 07-05-20 @ 08:46)  Source: .Urine Clean Catch (Midstream)  Final Report (07-06-20 @ 07:19):    No growth    Culture - Blood (collected 07-05-20 @ 00:29)  Source: .Blood Blood-Peripheral    Culture - Blood (collected 07-05-20 @ 00:29)  Source: .Blood Blood-Peripheral      WBC Count: 6.99 K/uL (07-08-20 @ 11:53)  WBC Count: 8.17 K/uL (07-07-20 @ 07:27)  WBC Count: 9.63 K/uL (07-06-20 @ 04:49)  WBC Count: 8.31 K/uL (07-05-20 @ 00:27)    Creatinine, Serum: 0.75 mg/dL (07-08-20 @ 11:53)  Creatinine, Serum: 0.75 mg/dL (07-07-20 @ 07:27)  Creatinine, Serum: 0.71 mg/dL (07-06-20 @ 04:49)  Creatinine, Serum: 0.76 mg/dL (07-05-20 @ 00:27)    C-Reactive Protein, Serum: 10.80 mg/dL (07-05-20 @ 06:46)    Ferritin, Serum: 1487 ng/mL (07-07-20 @ 07:27)  Ferritin, Serum: 1133 ng/mL (07-06-20 @ 04:49)  Ferritin, Serum: 931 ng/mL (07-05-20 @ 06:46)    Sedimentation Rate, Erythrocyte: 38 mm/hr (07-05-20 @ 06:46)    Procalcitonin, Serum: 0.75 ng/mL (07-07-20 @ 07:27)  Procalcitonin, Serum: 0.77 ng/mL (07-05-20 @ 06:46)    COVID-19 PCR: NotDetec (07-06-20 @ 11:15)  COVID-19 PCR: NotDetec (07-05-20 @ 05:31)        < from: US Duplex Venous Lower Ext Complete, Bilateral (07.06.20 @ 15:25) >  EXAM:  US DPLX LWR EXT VEINS COMPL BI                          PROCEDURE DATE:  07/06/2020      INTERPRETATION:  CLINICAL INFORMATION: Fever. Left leg pain.    COMPARISON: None available.    TECHNIQUE: Duplex sonography of the BILATERAL LOWER extremity veins with color and spectral Doppler, with and without compression.      FINDINGS:    There is normal compressibility of the bilateral common femoral, femoral and popliteal veins.   Doppler examination shows normal spontaneous and phasic flow.    No calf vein thrombosis is detected.    IMPRESSION:   No evidence of deep venous thrombosis in either lower extremity.    < end of copied text >      < from: CT Abdomen and Pelvis w/ IV Cont (07.05.20 @ 02:05) >   EXAM:  CT ABDOMEN AND PELVIS IC                          PROCEDURE DATE:  07/05/2020      INTERPRETATION:  CT ABDOMEN AND PELVIS WITH CONTRAST    INDICATION: Left lower quadrant abdominal pain.    TECHNIQUE: Contrast enhanced CT of the abdomenand pelvis. Images are reformatted in the sagittal and coronal planes.    90 mL of Omnipaque 350 contrast material was injected IV.    COMPARISON: None.    FINDINGS:    Lower Thorax: No consolidation or effusion.    Liver: Enlarged fatty liver measuring 23 cm in length. Suggestion of fatty sparing at the gallbladder fossa.  Biliary: No significant dilatation. No calcified gallstones within the gallbladder.  Spleen: Enlarged measuring 16 cm in length. Multiple vague wedge-shaped opacities. Findings are nonspecific, likely represent splenic infarcts. Infectious/inflammatory or neoplastic process considered in the differential. Consider nonemergent correlation with MR for better characterization.  Pancreas: No inflammatory changes or ductal dilatation.  Adrenals: Normal.  Kidneys: Symmetrically enhancing without hydronephrosis.  Vessels: Normal caliber. Mild atherosclerotic disease of pelvic vessels.    GI tract: There is wall thickening of distal sigmoid colonic loop extending to the rectumsignificant surrounding stranding. This may be on the basis of inadequate distention or represent proctocolitis in appropriate clinical setting. Consider nonemergent colonoscopy evaluation this concern for underlying lesion. Mild diverticulosis without evidence of acute diverticulitis. No evidence of small bowel obstruction. Normal appendix.    Peritoneum/retroperitoneum and mesentery: No free air. No organized fluid collection. No adenopathy.    Pelvic organs/Bladder: No pelvic masses. Bladder is normal.    Abdominal wall: A small fat containing umbilical hernia is noted.  Bones and soft tissues: Mild multilevel degenerative changes of the spine are noted.    IMPRESSION:    Multiple vague wedge-shaped opacities in enlarged spleen. Findings are nonspecific, likely represent splenic infarcts. Infectious/inflammatory or neoplastic process considered in the differential. Consider nonemergent correlation with MR for better characterization.    Wall thickening of distal sigmoid colonic loop extending to the rectum significant surrounding stranding. This may be on the basis of inadequate distention or represent proctocolitis in appropriate clinical setting. Consider nonemergent colonoscopy evaluation if there concern for underlying lesion. No evidence of small bowel obstruction.     Normal appendix.    < end of copied text >      < from: TTE Echo Complete w/ Contrast w/ Doppler (07.06.20 @ 17:57) >  Summary:   1. Normal left ventricular size and wall thicknesses, with normal systolic and diastolic function.   2. Left ventricular ejection fraction, by visual estimation, is 60 to 65%.   3. Normal right ventricular size and function.   4. The left atrium is normal in size.   5. The right atrium is normal in size.   6. Structurally normal mitral valve, with normal leaflet excursion.   7. Trace mitral valve regurgitation.   8. Normal trileaflet aortic valve with normal opening.   9. There is no evidence of pericardial effusion.    < end of copied text >

## 2020-07-09 NOTE — DISCHARGE NOTE PROVIDER - PROVIDER TOKENS
PROVIDER:[TOKEN:[[id number]],FOLLOWUP:[1 week]],PROVIDER:[TOKEN:[[id number]]],PROVIDER:[TOKEN:[[id number]]]

## 2020-07-09 NOTE — DISCHARGE NOTE PROVIDER - CARE PROVIDERS DIRECT ADDRESSES
,DirectAddress_Unknown,[email or web address]

## 2020-07-09 NOTE — DISCHARGE NOTE PROVIDER - NSDCFUADDAPPT_GEN_ALL_CORE_FT
A DC FOLLOW UP APPOINTMENT HAS BEEN ARRANGED FOR YOU:  [facility]  MONDAY JULY 13 10:30AM W/ DR. DOVER  [facility]   [facility]  [phone]

## 2020-07-09 NOTE — PROGRESS NOTE ADULT - PROBLEM SELECTOR PLAN 3
Continue Lovenox. Monitor inflammatory markers.  Repeat COVID 19 sent, ID consulted for recommendations, check lower extremity ultrasound.
Continue Lovenox. Monitor inflammatory markers. Will consider hematology consultation.
No clear etiology, ECHO with no vegetations. Hematology evaluation appreciated, close outpatient follow up. ? EBV infection, viral load sent.
No clear etiology, ECHO with no vegetations. Hematology evaluation. On Lovenox.
No clear etiology, continue Lovenox, no LE DVT. Await ECHO results.

## 2020-07-10 LAB
AT III ACT/NOR PPP CHRO: 116 % — SIGNIFICANT CHANGE UP (ref 85–135)
CULTURE RESULTS: SIGNIFICANT CHANGE UP
CULTURE RESULTS: SIGNIFICANT CHANGE UP
DRVVT SCREEN TO CONFIRM RATIO: SIGNIFICANT CHANGE UP
LA NT DPL PPP QL: 35.4 SEC — SIGNIFICANT CHANGE UP
NORMALIZED SCT PPP-RTO: 1.06 RATIO — SIGNIFICANT CHANGE UP (ref 0–1.16)
NORMALIZED SCT PPP-RTO: SIGNIFICANT CHANGE UP
PROT C ACT/NOR PPP: 142 % — SIGNIFICANT CHANGE UP (ref 74–150)
PROT S FREE AG PPP IA-ACNC: 149 % — HIGH (ref 67–141)
SPECIMEN SOURCE: SIGNIFICANT CHANGE UP
SPECIMEN SOURCE: SIGNIFICANT CHANGE UP

## 2020-07-11 LAB
C TRACH RRNA SPEC QL NAA+PROBE: SIGNIFICANT CHANGE UP
CULTURE RESULTS: SIGNIFICANT CHANGE UP
CULTURE RESULTS: SIGNIFICANT CHANGE UP
N GONORRHOEA RRNA SPEC QL NAA+PROBE: SIGNIFICANT CHANGE UP
SPECIMEN SOURCE: SIGNIFICANT CHANGE UP

## 2020-07-12 LAB
CULTURE RESULTS: SIGNIFICANT CHANGE UP
SPECIMEN SOURCE: SIGNIFICANT CHANGE UP

## 2020-07-13 LAB
EBV DNA SERPL NAA+PROBE-ACNC: SIGNIFICANT CHANGE UP IU/ML
EBVPCR LOG: SIGNIFICANT CHANGE UP LOG10IU/ML

## 2020-07-17 NOTE — CDI QUERY NOTE - NSCDIOTHERTXTBX_GEN_ALL_CORE_HH
This patient was admitted with Proctocolitis and rule out sepsis:    Discharge Note Provider 7-9-2020 @ 14:04  PRINCIPAL DISCHARGE DIAGNOSIS  Diagnosis: Proctocolitis  Assessment and Plan of Treatment:     Sepsis has been documented throughout the chart without actually ruling in or out the diagnosis:    Discharge Note Provider 7-9-2020 @ 14:04  Given concern for sepsis, cultures were sent and empiric antibiotics were given.    Progress Note Adult-Hospitalist Attending 7-5-2020 @ 08:59  CC: SIRS r/o sepsis, uncontrolled diabetes mellitus, splenic infarct    Given concern for sepsis, cultures were sent and empiric antibiotics were given.     PROCEDURE DATE:  07/04/2020    INTERPRETATION:  Clinical Information: Sepsis  Technique: AP chest image.     Vitals upon admission:  · Temp (F)	 100.6 Degrees F  · Temp (C)	 38.1 Degrees C  · Heart Rate	 110 /min         Can you please clarify the diagnosis of sepsis?  A) Sepsis ruled in.  B) Sepsis ruled out.  C) other, please specify:

## 2021-04-26 ENCOUNTER — APPOINTMENT (OUTPATIENT)
Dept: GASTROENTEROLOGY | Facility: CLINIC | Age: 41
End: 2021-04-26

## 2021-11-15 ENCOUNTER — EMERGENCY (EMERGENCY)
Facility: HOSPITAL | Age: 41
LOS: 1 days | Discharge: HOME | End: 2021-11-15
Attending: EMERGENCY MEDICINE
Payer: MEDICAID

## 2021-11-15 VITALS
WEIGHT: 251.11 LBS | TEMPERATURE: 98 F | HEART RATE: 75 BPM | DIASTOLIC BLOOD PRESSURE: 65 MMHG | SYSTOLIC BLOOD PRESSURE: 145 MMHG | RESPIRATION RATE: 19 BRPM | HEIGHT: 67 IN | OXYGEN SATURATION: 98 %

## 2021-11-15 DIAGNOSIS — Z98.890 OTHER SPECIFIED POSTPROCEDURAL STATES: Chronic | ICD-10-CM

## 2021-11-15 PROCEDURE — 99284 EMERGENCY DEPT VISIT MOD MDM: CPT

## 2021-11-15 PROCEDURE — 0225U NFCT DS DNA&RNA 21 SARSCOV2: CPT

## 2021-11-15 PROCEDURE — 86618 LYME DISEASE ANTIBODY: CPT

## 2021-11-15 PROCEDURE — 99283 EMERGENCY DEPT VISIT LOW MDM: CPT

## 2021-11-15 PROCEDURE — 36415 COLL VENOUS BLD VENIPUNCTURE: CPT

## 2021-11-15 RX ORDER — VALACYCLOVIR 500 MG/1
1000 TABLET, FILM COATED ORAL ONCE
Refills: 0 | Status: COMPLETED | OUTPATIENT
Start: 2021-11-15 | End: 2021-11-15

## 2021-11-15 RX ORDER — VALACYCLOVIR 500 MG/1
1 TABLET, FILM COATED ORAL
Qty: 21 | Refills: 0
Start: 2021-11-15 | End: 2021-11-21

## 2021-11-15 RX ORDER — VALACYCLOVIR 500 MG/1
1 TABLET, FILM COATED ORAL
Qty: 21 | Refills: 0
Start: 2021-11-15 | End: 2021-11-22

## 2021-11-15 RX ADMIN — VALACYCLOVIR 1000 MILLIGRAM(S): 500 TABLET, FILM COATED ORAL at 22:04

## 2021-11-15 RX ADMIN — Medication 60 MILLIGRAM(S): at 21:18

## 2021-11-15 NOTE — ED PROVIDER NOTE - ATTENDING CONTRIBUTION TO CARE
41yoM; with PMH of DM; now p/w left facial numbness, and facial droop x5 days, including forehead, with left ear pain.  denies f/c/s. denies n/v. denies headache.  denies f/c/s.  Gen: Alert, NAD  Head: NC, AT, PERRL, EOMI, normal lids/conjunctiva  ENT: B TM WNL,  Neck: +supple, no tenderness/meningismus/JVD, +Trachea midline  Pulm: Bilateral BS, normal resp effort, no wheeze/stridor/retractions  CV: RRR, no M/R/G, 2+dist pulses  Mskel: ROM intact x4 extremities.  no edema/erythema/cyanosis  Skin: no rash, warm, dry  Neuro: AAOx3, left facial droop including forehead, otherwise neuro intact  A/P:  41yoM p/w bell's palsy  -valtrex, prednisone, augmentin, f/up with pmd.

## 2021-11-15 NOTE — ED PROVIDER NOTE - PATIENT PORTAL LINK FT
You can access the FollowMyHealth Patient Portal offered by [facility] by registering at the following website: [email or web address]. By joining [facility]’s FollowMyHealth portal, you will also be able to view your health information using other applications (apps) compatible with our system.

## 2021-11-15 NOTE — ED PROVIDER NOTE - OBJECTIVE STATEMENT
pt is a 40 y/o male with a pmhx of DM presenting to the ed for evaluation. pt states that he has been experiencing left sided facial numbness for the past five days. pt states has been experiencing tearing in the left eye. pt denies experiencing this before in the past. pt denies injuries or trauma to the area. pt denies history of lyme, any known tick bites, no recent covid/vaccines. pt denies recent URI. pt denies cp sob headache neck pain difficulty in walking abd pain nausea vomiting back pain

## 2021-11-15 NOTE — ED ADULT TRIAGE NOTE - CHIEF COMPLAINT QUOTE
Patient came in with complains of numbness with eye tearing at left side of face started on 5 days ago. Denies any numbness weakness at extremities. No other neuro and motor defect noted.

## 2021-11-15 NOTE — ED ADULT NURSE NOTE - OBJECTIVE STATEMENT
Assumed care of the Pt Aox4 in no acute distress, Pt complaining of left sided facial numbness and eye tearing for the last 5 day no neuro deficits noted. Pt denies any injury to the area. pt VSS, breathing even and unlabored Pt meds given as per orders labs sent as per orders pt educated on plan of care, pt able to successfully teach back plan of care to RN, RN will continue to reeducate pt during hospital stay.

## 2021-11-15 NOTE — ED PROVIDER NOTE - PHYSICAL EXAMINATION
Const: Awake, alert and oriented. In no acute distress. Well appearing.  HEENT: NC/AT. Moist mucous membranes.  Eyes: No scleral icterus. EOMI.  Neck:. Soft and supple. Full ROM without pain.  Cardiac: +S1/S2. No murmurs. Peripheral pulses 2+ and symmetric. No LE edema.  Resp: Speaking in full sentences. No evidence of respiratory distress. No wheezes, rales or rhonchi.  Abd: Soft, non-tender, non-distended. Normal bowel sounds in all 4 quadrants. No guarding or rebound.  Back: Spine midline and non-tender. No CVAT.  Skin: No rashes, abrasions or lacerations.  Lymph: No cervical lymphadenopathy.  Neuro: Awake, alert & oriented x 3.  left facial droop including forehead muscle strength fair neuovasculary intact gait without ataxia

## 2021-11-15 NOTE — ED PROVIDER NOTE - CARE PROVIDER_API CALL
Ilya Wan; PhD)  Neurology; Vascular Neurology  [address] [place] [address]  Phone: [phone]  Fax: [phone]  Follow Up Time:

## 2021-11-16 RX ORDER — VALACYCLOVIR 500 MG/1
1 TABLET, FILM COATED ORAL
Qty: 21 | Refills: 0
Start: 2021-11-16 | End: 2021-11-22

## 2021-11-18 LAB
B BURGDOR C6 AB SER-ACNC: NEGATIVE — SIGNIFICANT CHANGE UP
B BURGDOR IGG+IGM SER-ACNC: 0.11 INDEX — SIGNIFICANT CHANGE UP (ref 0.01–0.89)

## 2021-11-19 ENCOUNTER — APPOINTMENT (OUTPATIENT)
Dept: NEUROLOGY | Facility: CLINIC | Age: 41
End: 2021-11-19
Payer: MEDICAID

## 2021-11-19 ENCOUNTER — APPOINTMENT (OUTPATIENT)
Dept: NEUROLOGY | Facility: CLINIC | Age: 41
End: 2021-11-19

## 2021-11-19 VITALS
BODY MASS INDEX: 41.14 KG/M2 | DIASTOLIC BLOOD PRESSURE: 74 MMHG | TEMPERATURE: 97.8 F | SYSTOLIC BLOOD PRESSURE: 118 MMHG | HEART RATE: 70 BPM | WEIGHT: 256 LBS | HEIGHT: 66 IN

## 2021-11-19 DIAGNOSIS — Z78.9 OTHER SPECIFIED HEALTH STATUS: ICD-10-CM

## 2021-11-19 DIAGNOSIS — G47.33 OBSTRUCTIVE SLEEP APNEA (ADULT) (PEDIATRIC): ICD-10-CM

## 2021-11-19 DIAGNOSIS — G51.0 BELL'S PALSY: ICD-10-CM

## 2021-11-19 DIAGNOSIS — Z86.39 PERSONAL HISTORY OF OTHER ENDOCRINE, NUTRITIONAL AND METABOLIC DISEASE: ICD-10-CM

## 2021-11-19 DIAGNOSIS — M79.2 NEURALGIA AND NEURITIS, UNSPECIFIED: ICD-10-CM

## 2021-11-19 DIAGNOSIS — E11.9 TYPE 2 DIABETES MELLITUS W/OUT COMPLICATIONS: ICD-10-CM

## 2021-11-19 PROCEDURE — 99204 OFFICE O/P NEW MOD 45 MIN: CPT

## 2021-11-19 RX ORDER — DAPAGLIFLOZIN AND METFORMIN HYDROCHLORIDE 5; 1000 MG/1; MG/1
5-1000 TABLET, FILM COATED, EXTENDED RELEASE ORAL
Refills: 0 | Status: ACTIVE | COMMUNITY

## 2021-11-19 RX ORDER — VALACYCLOVIR 1 G/1
1 TABLET, FILM COATED ORAL
Refills: 0 | Status: ACTIVE | COMMUNITY

## 2021-11-19 RX ORDER — AMOXICILLIN AND CLAVULANATE POTASSIUM 875; 125 MG/1; MG/1
875-125 TABLET, COATED ORAL
Refills: 0 | Status: ACTIVE | COMMUNITY

## 2021-11-19 RX ORDER — PREDNISONE 20 MG/1
20 TABLET ORAL
Refills: 0 | Status: ACTIVE | COMMUNITY

## 2021-11-19 RX ORDER — MINERAL OIL, AND WHITE PETROLATUM 425; 573 MG/G; MG/G
OINTMENT OPHTHALMIC DAILY
Qty: 1 | Refills: 5 | Status: ACTIVE | COMMUNITY
Start: 2021-11-19 | End: 1900-01-01

## 2021-11-19 RX ORDER — GABAPENTIN 300 MG/1
300 CAPSULE ORAL
Qty: 30 | Refills: 3 | Status: ACTIVE | COMMUNITY
Start: 2021-11-19 | End: 1900-01-01

## 2021-11-19 NOTE — DISCUSSION/SUMMARY
[FreeTextEntry1] : Mr. Corrigan is a 41 year old man who presents with several days of left sided facial pain and weakness.\par \par Bell's Palsy\par -He has left sided upper and lower facial weakness consistent with Bell's Palsy. 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 \par -Patch eye at night if unable to close eye fully.\par -Will prescribe gabapentin 300 mg qhs to help with left facial pain/neuralgia. Advised of potential side effects including mood disturbance.\par \par \par \par \par Obstructive Sleep Apnea\par -He has a history of CIARAN. His CPAP machine was broken and he has not been able to receive a replacement machine.\par -I advised him to contact the [employer] company to let them know that the machine needs to be replaced. If an updated sleep study is required, we can perform a home sleep study.\par \par f/u 1-2 months, sooner if needed.
Yes

## 2021-11-19 NOTE — CONSULT LETTER
[Dear  ___] : Dear  [unfilled], [Consult Letter:] : I had the pleasure of evaluating your patient, [unfilled]. [Please see my note below.] : Please see my note below. [Consult Closing:] : Thank you very much for allowing me to participate in the care of this patient.  If you have any questions, please do not hesitate to contact me. [FreeTextEntry2] : Bruno Burgess [FreeTextEntry3] : Sincerely,\par \par \par Bailee Tavarez MD\par Diplomate, American Academy of Psychiatry and Neurology\par Board Certified in the Subspecialty of Clinical Neurophysiology\par Board Certified in the Subspecialty of Sleep Medicine\par Board Certified in the Subspecialty of Epilepsy\par

## 2021-11-19 NOTE — PHYSICAL EXAM
[FreeTextEntry1] : Examination:\par Constitutional: normal, no apparent distress\par Eyes: normal conjunctiva b/l, no ptosis, visual fields full\par Ears: possible postule in left ear canal\par Respiratory: no respiratory distress, normal effort, normal auscultation\par Cardiovascular: normal rate, rhythm, no murmurs\par Neck: supple, no masses\par Vascular: carotids normal\par Skin: normal color, no rashes\par Psych: normal mood, affect\par \par Neurological:\par Memory: normal memory, oriented to person, place, time\par Language intact/no aphasia\par Cranial Nerves: Pupils equally round and reactive to light, ocular muscles/movements intact, no ptosis, weakness of left upper and lower face. Able to close left eye completely but I am able to open it against resistance,  tongue protrudes normally in the midline, \par Motor: normal tone, no pronator drift, full strength in all four extremities in the proximal and distal muscle groups\par Coordination: Fine motor movements intact, rapid alternating movements intact, finger to nose intact bilaterally\par Sensory: intact to light touch, vibration, joint position sense\par DTRs: symmetric, 1+ in b/l triceps, 1+ in b/l biceps, 1+ in b/l brachioradialis, 1+ in bilateral patellars, 1+ in bilateral Achilles, Babinskis negative bilaterally\par Gait: narrow based, steady\par \par

## 2021-11-19 NOTE — HISTORY OF PRESENT ILLNESS
[FreeTextEntry1] : Mr. Corrigan is here today for neurology evaluation.\par [employer], [place] [id number], assisted with the visit.\par \par He tells me that he had pain, numbness and swelling on the left side of his face starting about 6 days ago.\par He reports that his eye is tearing and his mouth is drooping. His tongue feels numb.\par About 1.5 months ago he had a tooth ache and then had neck pain.\par \par He was seen in the ED at [facility] on 11/15/21. He states that he had left ear pain that was no longer tolerable and pain when biting/chewing.\par He describes the pain as burning.\par He was prescribed prednisone and valacyclovir in the ED.\par \par He has been using eye drops.\par \par He is aware of snoring.\par He had a sleep stud in the past. \par He was prescribed a CPAP machine but he does not use it anymore because it was damaged.\par He did feel more refreshed when he was using CPAP.\par \par

## 2021-12-09 ENCOUNTER — APPOINTMENT (OUTPATIENT)
Dept: MRI IMAGING | Facility: CLINIC | Age: 41
End: 2021-12-09

## 2021-12-09 ENCOUNTER — OUTPATIENT (OUTPATIENT)
Dept: OUTPATIENT SERVICES | Facility: HOSPITAL | Age: 41
LOS: 1 days | End: 2021-12-09

## 2021-12-09 DIAGNOSIS — Z98.890 OTHER SPECIFIED POSTPROCEDURAL STATES: Chronic | ICD-10-CM

## 2021-12-09 DIAGNOSIS — G51.0 BELL'S PALSY: ICD-10-CM

## 2022-01-06 ENCOUNTER — APPOINTMENT (OUTPATIENT)
Dept: NEUROLOGY | Facility: CLINIC | Age: 42
End: 2022-01-06

## 2022-06-07 ENCOUNTER — EMERGENCY (EMERGENCY)
Facility: HOSPITAL | Age: 42
LOS: 1 days | Discharge: HOME | End: 2022-06-07
Attending: STUDENT IN AN ORGANIZED HEALTH CARE EDUCATION/TRAINING PROGRAM
Payer: SELF-PAY

## 2022-06-07 VITALS
OXYGEN SATURATION: 96 % | HEART RATE: 87 BPM | HEIGHT: 67 IN | DIASTOLIC BLOOD PRESSURE: 80 MMHG | SYSTOLIC BLOOD PRESSURE: 129 MMHG | TEMPERATURE: 98 F | RESPIRATION RATE: 18 BRPM

## 2022-06-07 DIAGNOSIS — Z98.890 OTHER SPECIFIED POSTPROCEDURAL STATES: Chronic | ICD-10-CM

## 2022-06-07 LAB
BASOPHILS # BLD AUTO: 0.06 K/UL — SIGNIFICANT CHANGE UP (ref 0–0.2)
BASOPHILS NFR BLD AUTO: 1.2 % — SIGNIFICANT CHANGE UP (ref 0–2)
EOSINOPHIL # BLD AUTO: 0.22 K/UL — SIGNIFICANT CHANGE UP (ref 0–0.5)
EOSINOPHIL NFR BLD AUTO: 4.3 % — SIGNIFICANT CHANGE UP (ref 0–6)
HCT VFR BLD CALC: 33.4 % — LOW (ref 39–50)
HGB BLD-MCNC: 11.1 G/DL — LOW (ref 13–17)
IMM GRANULOCYTES NFR BLD AUTO: 0.4 % — SIGNIFICANT CHANGE UP (ref 0–1.5)
LYMPHOCYTES # BLD AUTO: 1.96 K/UL — SIGNIFICANT CHANGE UP (ref 1–3.3)
LYMPHOCYTES # BLD AUTO: 38.2 % — SIGNIFICANT CHANGE UP (ref 13–44)
MCHC RBC-ENTMCNC: 30 PG — SIGNIFICANT CHANGE UP (ref 27–34)
MCHC RBC-ENTMCNC: 33.2 GM/DL — SIGNIFICANT CHANGE UP (ref 32–36)
MCV RBC AUTO: 90.3 FL — SIGNIFICANT CHANGE UP (ref 80–100)
MONOCYTES # BLD AUTO: 0.4 K/UL — SIGNIFICANT CHANGE UP (ref 0–0.9)
MONOCYTES NFR BLD AUTO: 7.8 % — SIGNIFICANT CHANGE UP (ref 2–14)
NEUTROPHILS # BLD AUTO: 2.47 K/UL — SIGNIFICANT CHANGE UP (ref 1.8–7.4)
NEUTROPHILS NFR BLD AUTO: 48.1 % — SIGNIFICANT CHANGE UP (ref 43–77)
PLATELET # BLD AUTO: 188 K/UL — SIGNIFICANT CHANGE UP (ref 150–400)
RBC # BLD: 3.7 M/UL — LOW (ref 4.2–5.8)
RBC # FLD: 12.9 % — SIGNIFICANT CHANGE UP (ref 10.3–14.5)
WBC # BLD: 5.13 K/UL — SIGNIFICANT CHANGE UP (ref 3.8–10.5)
WBC # FLD AUTO: 5.13 K/UL — SIGNIFICANT CHANGE UP (ref 3.8–10.5)

## 2022-06-07 PROCEDURE — 99284 EMERGENCY DEPT VISIT MOD MDM: CPT

## 2022-06-07 RX ORDER — ACETAMINOPHEN 500 MG
975 TABLET ORAL ONCE
Refills: 0 | Status: COMPLETED | OUTPATIENT
Start: 2022-06-07 | End: 2022-06-07

## 2022-06-07 RX ADMIN — Medication 975 MILLIGRAM(S): at 23:52

## 2022-06-07 NOTE — ED PROVIDER NOTE - PHYSICAL EXAMINATION
General: well appearing, NAD  Head: NC, AT  EENT: no scleral icterus  Cardiac: RRR, no apparent murmurs, no lower extremity edema  Respiratory: CTABL, no respiratory distress   Abdomen: soft, ND, + LUQ ttp, nonperitonitic  MSK/Vascular: full ROM, soft compartments, warm extremities  Neuro: AAOx3, sensation to light touch intact  Psych: calm, cooperative
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
## 2022-06-07 NOTE — ED PROVIDER NOTE - NS ED ROS FT
Constitutional: no fever, no chills  Head: NC, AT   Eyes: no redness   ENMT: no nasal congestion/drainage, no sore throat   CV: no chest pain, no edema  Resp: no cough, no dyspnea  GI: + abdominal pain, no nausea, no vomiting, no diarrhea  : no dysuria, no hematuria   Skin: no lesions, no rashes   Neuro: no LOC, no headache, no sensory deficits, no weakness

## 2022-06-07 NOTE — ED PROVIDER NOTE - NS ED ATTENDING STATEMENT MOD
I have seen and examined this patient and fully participated in the care of this patient as the teaching attending.  The service was shared with the DARWIN.  I reviewed and verified the documentation and independently performed the documented:

## 2022-06-07 NOTE — ED PROVIDER NOTE - CARE PROVIDER_API CALL
Brooklyn Butler)  Gastroenterology  [address] [place] [id number]  Phone: [phone]  Fax: [phone]  Follow Up Time:

## 2022-06-07 NOTE — ED PROVIDER NOTE - NSFOLLOWUPINSTRUCTIONS_ED_ALL_ED_FT
- Follow up with your doctor within 2-3 days.   - Return to the ED for any new or worsening symptoms.   - Follow-up with PMD and/or GI doctor for further evaluation of "Stable small nodes adjacent to the distal esophagus measuring up to 6 mm"      - Seguimiento con garcia médico dentro de 2-3 días.  - Regrese al servicio de urgencias por cualquier síntoma nuevo o que empeore.  - Seguimiento con PMD y/o médico GI para loraine evaluación adicional de "nódulos pequeños estables adyacentes al esófago distal que miden hasta 6 mm"

## 2022-06-07 NOTE — ED PROVIDER NOTE - PATIENT PORTAL LINK FT
You can access the FollowMyHealth Patient Portal offered by [facility] by registering at the following website: [email or web address]. By joining [facility]’s FollowMyHealth portal, you will also be able to view your health information using other applications (apps) compatible with our system.

## 2022-06-07 NOTE — ED PROVIDER NOTE - PROGRESS NOTE DETAILS
PEDRO Broussard: no splenic infarct or laceration on ct. incidental finding of "Stable small nodes adjacent to the distal esophagus measuring up to 6 mm". all results d/w pt and family. provided copy of results and f/u information for GI doctor. return precautions discussed.  : Language Line ID [id number]
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
## 2022-06-07 NOTE — ED PROCEDURE NOTE - PROCEDURE ADDITIONAL DETAILS
Emergency Department Focused Ultrasound performed at patient's bedside for educational purposes. The study will have a follow up study performed or was performed in the direct supervision of an ultrasound trained attending. No abnormal findings.
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
## 2022-06-08 LAB
ALBUMIN SERPL ELPH-MCNC: 4.7 G/DL — SIGNIFICANT CHANGE UP (ref 3.3–5.2)
ALP SERPL-CCNC: 104 U/L — SIGNIFICANT CHANGE UP (ref 40–120)
ALT FLD-CCNC: 61 U/L — HIGH
ANION GAP SERPL CALC-SCNC: 14 MMOL/L — SIGNIFICANT CHANGE UP (ref 5–17)
AST SERPL-CCNC: 32 U/L — SIGNIFICANT CHANGE UP
BILIRUB SERPL-MCNC: 0.4 MG/DL — SIGNIFICANT CHANGE UP (ref 0.4–2)
BUN SERPL-MCNC: 16.2 MG/DL — SIGNIFICANT CHANGE UP (ref 8–20)
CALCIUM SERPL-MCNC: 9.4 MG/DL — SIGNIFICANT CHANGE UP (ref 8.6–10.2)
CHLORIDE SERPL-SCNC: 97 MMOL/L — LOW (ref 98–107)
CO2 SERPL-SCNC: 28 MMOL/L — SIGNIFICANT CHANGE UP (ref 22–29)
CREAT SERPL-MCNC: 0.81 MG/DL — SIGNIFICANT CHANGE UP (ref 0.5–1.3)
EGFR: 112 ML/MIN/1.73M2 — SIGNIFICANT CHANGE UP
GLUCOSE SERPL-MCNC: 250 MG/DL — HIGH (ref 70–99)
LIDOCAIN IGE QN: 47 U/L — SIGNIFICANT CHANGE UP (ref 22–51)
POTASSIUM SERPL-MCNC: 4.2 MMOL/L — SIGNIFICANT CHANGE UP (ref 3.5–5.3)
POTASSIUM SERPL-SCNC: 4.2 MMOL/L — SIGNIFICANT CHANGE UP (ref 3.5–5.3)
PROT SERPL-MCNC: 8.1 G/DL — SIGNIFICANT CHANGE UP (ref 6.6–8.7)
SODIUM SERPL-SCNC: 138 MMOL/L — SIGNIFICANT CHANGE UP (ref 135–145)

## 2022-06-08 PROCEDURE — 36415 COLL VENOUS BLD VENIPUNCTURE: CPT

## 2022-06-08 PROCEDURE — 74177 CT ABD & PELVIS W/CONTRAST: CPT | Mod: 26,MA

## 2022-06-08 PROCEDURE — 99284 EMERGENCY DEPT VISIT MOD MDM: CPT | Mod: 25

## 2022-06-08 PROCEDURE — 80053 COMPREHEN METABOLIC PANEL: CPT

## 2022-06-08 PROCEDURE — 74177 CT ABD & PELVIS W/CONTRAST: CPT | Mod: MA

## 2022-06-08 PROCEDURE — 83690 ASSAY OF LIPASE: CPT

## 2022-06-08 PROCEDURE — 85025 COMPLETE CBC W/AUTO DIFF WBC: CPT

## 2022-06-08 PROCEDURE — 71045 X-RAY EXAM CHEST 1 VIEW: CPT | Mod: 26

## 2022-06-08 PROCEDURE — 71045 X-RAY EXAM CHEST 1 VIEW: CPT

## 2022-06-08 NOTE — ED ADULT NURSE NOTE - PRO INTERPRETER NEED 2
Additional Area 3 Units: 0 Consent: Written consent obtained. Risks include but not limited to lid/brow ptosis, bruising, swelling, diplopia, temporary effect, incomplete chemical denervation. Lot #: [id number] Dilution (U/ 0.1cc): 10 Detail Level: Zone Additional Area 1 Units: 40 Expiration Date (Month Year): 09/18 Additional Area 1 Location: neck bands [place]

## 2022-06-08 NOTE — ED ADULT NURSE NOTE - OBJECTIVE STATEMENT
Aox4. Pt complaining of LUQ abd pain x 1 day. ABD tender to touch. PMHx of DM. Denies abnormal bowel movements. Denies N/V/D, SOB, chest pain. Respirations even and unlabored. Skin warm to touch.

## 2024-02-29 NOTE — H&P ADULT - NEGATIVE SKIN SYMPTOMS
I attest my time as JAZMIN is greater than 50% of the total combined time spent on qualifying patient care activities. I have reviewed and verified the documentation. I attest my time as JAZMIN is greater than 50% of the total combined time spent on qualifying patient care activities. I have reviewed and verified the documentation. I attest my time as JAZMIN is greater than 50% of the total combined time spent on qualifying patient care activities. I have reviewed and verified the documentation. I attest my time as JAZMIN is greater than 50% of the total combined time spent on qualifying patient care activities. I have reviewed and verified the documentation. I attest my time as JAZMIN is greater than 50% of the total combined time spent on qualifying patient care activities. I have reviewed and verified the documentation. I attest my time as JAZMIN is greater than 50% of the total combined time spent on qualifying patient care activities. I have reviewed and verified the documentation. no rash I attest my time as JAZMIN is greater than 50% of the total combined time spent on qualifying patient care activities. I have reviewed and verified the documentation. I attest my time as JAZMIN is greater than 50% of the total combined time spent on qualifying patient care activities. I have reviewed and verified the documentation. I attest my time as JAZMIN is greater than 50% of the total combined time spent on qualifying patient care activities. I have reviewed and verified the documentation. I attest my time as JAZMIN is greater than 50% of the total combined time spent on qualifying patient care activities. I have reviewed and verified the documentation.